# Patient Record
Sex: MALE | Race: WHITE | NOT HISPANIC OR LATINO
[De-identification: names, ages, dates, MRNs, and addresses within clinical notes are randomized per-mention and may not be internally consistent; named-entity substitution may affect disease eponyms.]

---

## 2017-05-18 ENCOUNTER — APPOINTMENT (OUTPATIENT)
Dept: HEMATOLOGY ONCOLOGY | Facility: CLINIC | Age: 72
End: 2017-05-18

## 2017-05-18 ENCOUNTER — OUTPATIENT (OUTPATIENT)
Dept: OUTPATIENT SERVICES | Facility: HOSPITAL | Age: 72
LOS: 1 days | Discharge: HOME | End: 2017-05-18

## 2017-05-18 VITALS
RESPIRATION RATE: 12 BRPM | SYSTOLIC BLOOD PRESSURE: 135 MMHG | HEART RATE: 68 BPM | TEMPERATURE: 97.1 F | BODY MASS INDEX: 25.88 KG/M2 | DIASTOLIC BLOOD PRESSURE: 59 MMHG | HEIGHT: 66 IN | WEIGHT: 161 LBS

## 2017-05-18 LAB
BASOPHILS # BLD: 0.06 TH/MM3
BASOPHILS NFR BLD: 1.5 %
EOSINOPHIL # BLD: 0.15 TH/MM3
EOSINOPHIL NFR BLD: 3.7 %
ERYTHROCYTE [DISTWIDTH] IN BLOOD BY AUTOMATED COUNT: 13.3 %
GRANULOCYTES # BLD: 2.49 TH/MM3
GRANULOCYTES NFR BLD: 62.2 %
HCT VFR BLD AUTO: 43 %
HGB BLD-MCNC: 15.2 G/DL
IMM GRANULOCYTES # BLD: 0.01 TH/MM3
IMM GRANULOCYTES NFR BLD: 0.2 %
LYMPHOCYTES # BLD: 1.02 TH/MM3
LYMPHOCYTES NFR BLD: 25.4 %
MCH RBC QN AUTO: 31 PG
MCHC RBC AUTO-ENTMCNC: 35.3 G/DL
MCV RBC AUTO: 87.8 FL
MONOCYTES # BLD: 0.28 TH/MM3
MONOCYTES NFR BLD: 7 %
PLATELET # BLD: 138 TH/MM3
PMV BLD AUTO: 10.4 FL
RBC # BLD AUTO: 4.9 MIL/MM3
WBC # BLD: 4.01 TH/MM3

## 2017-05-19 LAB
ALBUMIN SERPL-MCNC: 4.2 G/DL
ALBUMIN/GLOB SERPL: 2.21
ALP SERPL-CCNC: 55 IU/L
ALT SERPL-CCNC: 17 IU/L
ANION GAP SERPL CALC-SCNC: 11 MEQ/L
AST SERPL-CCNC: 24 IU/L
BILIRUB SERPL-MCNC: 0.8 MG/DL
BUN SERPL-MCNC: 12 MG/DL
BUN/CREAT SERPL: 12 %
CALCIUM SERPL-MCNC: 9.8 MG/DL
CHLORIDE SERPL-SCNC: 97 MEQ/L
CO2 SERPL-SCNC: 28 MEQ/L
CREAT SERPL-MCNC: 1 MG/DL
GFR SERPL CREATININE-BSD FRML MDRD: 74
GLUCOSE SERPL-MCNC: 77 MG/DL
IGG FLD-MCNC: 819 MG/DL
POTASSIUM SERPL-SCNC: 4.1 MMOL/L
PROT SERPL-MCNC: 6.1 G/DL
SODIUM SERPL-SCNC: 136 MEQ/L

## 2017-06-28 DIAGNOSIS — C85.90 NON-HODGKIN LYMPHOMA, UNSPECIFIED, UNSPECIFIED SITE: ICD-10-CM

## 2017-11-08 ENCOUNTER — APPOINTMENT (OUTPATIENT)
Dept: OTOLARYNGOLOGY | Facility: CLINIC | Age: 72
End: 2017-11-08
Payer: MEDICARE

## 2017-11-08 PROCEDURE — 99214 OFFICE O/P EST MOD 30 MIN: CPT | Mod: 25

## 2017-11-08 PROCEDURE — 31575 DIAGNOSTIC LARYNGOSCOPY: CPT

## 2017-11-08 RX ORDER — METOPROLOL SUCCINATE 100 MG/1
100 TABLET, EXTENDED RELEASE ORAL
Qty: 90 | Refills: 0 | Status: ACTIVE | COMMUNITY
Start: 2017-09-06

## 2017-11-20 ENCOUNTER — APPOINTMENT (OUTPATIENT)
Dept: HEMATOLOGY ONCOLOGY | Facility: CLINIC | Age: 72
End: 2017-11-20

## 2017-11-20 ENCOUNTER — RESULT REVIEW (OUTPATIENT)
Age: 72
End: 2017-11-20

## 2017-11-20 ENCOUNTER — OUTPATIENT (OUTPATIENT)
Dept: OUTPATIENT SERVICES | Facility: HOSPITAL | Age: 72
LOS: 1 days | Discharge: HOME | End: 2017-11-20

## 2017-11-20 VITALS
TEMPERATURE: 96.1 F | DIASTOLIC BLOOD PRESSURE: 78 MMHG | BODY MASS INDEX: 26.03 KG/M2 | WEIGHT: 162 LBS | HEIGHT: 66 IN | RESPIRATION RATE: 12 BRPM | HEART RATE: 72 BPM | SYSTOLIC BLOOD PRESSURE: 130 MMHG

## 2017-11-20 LAB
BASOPHILS # BLD: 0.02 TH/MM3
BASOPHILS NFR BLD: 0.5 %
EOSINOPHIL # BLD: 0.11 TH/MM3
EOSINOPHIL NFR BLD: 2.6 %
ERYTHROCYTE [DISTWIDTH] IN BLOOD BY AUTOMATED COUNT: 13.5 %
GRANULOCYTES # BLD: 2.78 TH/MM3
GRANULOCYTES NFR BLD: 65.9 %
HCT VFR BLD AUTO: 41.7 %
HGB BLD-MCNC: 14.4 G/DL
IMM GRANULOCYTES # BLD: 0.01 TH/MM3
IMM GRANULOCYTES NFR BLD: 0.2 %
LYMPHOCYTES # BLD: 0.83 TH/MM3
LYMPHOCYTES NFR BLD: 19.7 %
MCH RBC QN AUTO: 30.3 PG
MCHC RBC AUTO-ENTMCNC: 34.5 G/DL
MCV RBC AUTO: 87.6 FL
MONOCYTES # BLD: 0.47 TH/MM3
MONOCYTES NFR BLD: 11.1 %
PLATELET # BLD: 132 TH/MM3
PMV BLD AUTO: 11 FL
RBC # BLD AUTO: 4.76 MIL/MM3
WBC # BLD: 4.22 TH/MM3

## 2017-11-21 LAB
ALBUMIN SERPL-MCNC: 3.9 G/DL
ALBUMIN/GLOB SERPL: 2.17
ALP SERPL-CCNC: 54 IU/L
ALT SERPL-CCNC: 15 IU/L
ANION GAP SERPL CALC-SCNC: 10 MEQ/L
AST SERPL-CCNC: 18 IU/L
BILIRUB SERPL-MCNC: 0.7 MG/DL
BUN SERPL-MCNC: 14 MG/DL
BUN/CREAT SERPL: 15.2 %
CALCIUM SERPL-MCNC: 9.3 MG/DL
CHLORIDE SERPL-SCNC: 99 MEQ/L
CO2 SERPL-SCNC: 28 MEQ/L
CREAT SERPL-MCNC: 0.92 MG/DL
GFR SERPL CREATININE-BSD FRML MDRD: 81
GLUCOSE SERPL-MCNC: 65 MG/DL
LACTATE DEHYDROGENASE (NORTH): 167 IU/L
POTASSIUM SERPL-SCNC: 3.9 MMOL/L
PROT SERPL-MCNC: 5.7 G/DL
SODIUM SERPL-SCNC: 137 MEQ/L

## 2017-11-27 DIAGNOSIS — C85.90 NON-HODGKIN LYMPHOMA, UNSPECIFIED, UNSPECIFIED SITE: ICD-10-CM

## 2017-12-13 ENCOUNTER — APPOINTMENT (OUTPATIENT)
Dept: OTOLARYNGOLOGY | Facility: CLINIC | Age: 72
End: 2017-12-13
Payer: MEDICARE

## 2017-12-13 VITALS — HEIGHT: 66 IN | WEIGHT: 162 LBS | BODY MASS INDEX: 26.03 KG/M2

## 2017-12-13 PROCEDURE — 31575 DIAGNOSTIC LARYNGOSCOPY: CPT

## 2017-12-13 PROCEDURE — 99213 OFFICE O/P EST LOW 20 MIN: CPT | Mod: 25

## 2018-01-31 ENCOUNTER — APPOINTMENT (OUTPATIENT)
Dept: OTOLARYNGOLOGY | Facility: CLINIC | Age: 73
End: 2018-01-31
Payer: MEDICARE

## 2018-01-31 PROCEDURE — 31575 DIAGNOSTIC LARYNGOSCOPY: CPT

## 2018-01-31 PROCEDURE — 99213 OFFICE O/P EST LOW 20 MIN: CPT | Mod: 25

## 2018-01-31 RX ORDER — OMEPRAZOLE 40 MG/1
40 CAPSULE, DELAYED RELEASE ORAL TWICE DAILY
Qty: 60 | Refills: 3 | Status: ACTIVE | COMMUNITY
Start: 2018-01-31 | End: 1900-01-01

## 2018-02-28 ENCOUNTER — APPOINTMENT (OUTPATIENT)
Dept: OTOLARYNGOLOGY | Facility: CLINIC | Age: 73
End: 2018-02-28
Payer: MEDICARE

## 2018-02-28 PROCEDURE — 99213 OFFICE O/P EST LOW 20 MIN: CPT | Mod: 25

## 2018-02-28 PROCEDURE — 31575 DIAGNOSTIC LARYNGOSCOPY: CPT

## 2018-04-11 ENCOUNTER — APPOINTMENT (OUTPATIENT)
Dept: OTOLARYNGOLOGY | Facility: CLINIC | Age: 73
End: 2018-04-11
Payer: MEDICARE

## 2018-04-11 PROCEDURE — 31575 DIAGNOSTIC LARYNGOSCOPY: CPT

## 2018-04-11 PROCEDURE — 99213 OFFICE O/P EST LOW 20 MIN: CPT | Mod: 25

## 2018-04-12 ENCOUNTER — LABORATORY RESULT (OUTPATIENT)
Age: 73
End: 2018-04-12

## 2018-04-12 ENCOUNTER — OUTPATIENT (OUTPATIENT)
Dept: OUTPATIENT SERVICES | Facility: HOSPITAL | Age: 73
LOS: 1 days | Discharge: HOME | End: 2018-04-12

## 2018-04-12 DIAGNOSIS — R49.0 DYSPHONIA: ICD-10-CM

## 2018-04-23 ENCOUNTER — OUTPATIENT (OUTPATIENT)
Dept: OUTPATIENT SERVICES | Facility: HOSPITAL | Age: 73
LOS: 1 days | Discharge: HOME | End: 2018-04-23

## 2018-04-23 DIAGNOSIS — R49.0 DYSPHONIA: ICD-10-CM

## 2018-04-24 ENCOUNTER — EMERGENCY (EMERGENCY)
Facility: HOSPITAL | Age: 73
LOS: 0 days | Discharge: LEFT BEFORE DISCHARGE | End: 2018-04-24
Attending: EMERGENCY MEDICINE | Admitting: EMERGENCY MEDICINE

## 2018-04-24 VITALS
TEMPERATURE: 98 F | DIASTOLIC BLOOD PRESSURE: 60 MMHG | RESPIRATION RATE: 18 BRPM | HEART RATE: 85 BPM | SYSTOLIC BLOOD PRESSURE: 110 MMHG | OXYGEN SATURATION: 98 %

## 2018-04-24 DIAGNOSIS — R21 RASH AND OTHER NONSPECIFIC SKIN ERUPTION: ICD-10-CM

## 2018-04-24 DIAGNOSIS — E89.0 POSTPROCEDURAL HYPOTHYROIDISM: Chronic | ICD-10-CM

## 2018-04-24 DIAGNOSIS — R51 HEADACHE: ICD-10-CM

## 2018-04-24 DIAGNOSIS — I10 ESSENTIAL (PRIMARY) HYPERTENSION: ICD-10-CM

## 2018-04-24 DIAGNOSIS — C85.90 NON-HODGKIN LYMPHOMA, UNSPECIFIED, UNSPECIFIED SITE: ICD-10-CM

## 2018-04-24 DIAGNOSIS — E03.9 HYPOTHYROIDISM, UNSPECIFIED: ICD-10-CM

## 2018-04-24 DIAGNOSIS — F17.210 NICOTINE DEPENDENCE, CIGARETTES, UNCOMPLICATED: ICD-10-CM

## 2018-04-24 RX ORDER — DIPHENHYDRAMINE HCL 50 MG
1 CAPSULE ORAL
Qty: 12 | Refills: 0 | OUTPATIENT
Start: 2018-04-24 | End: 2018-04-26

## 2018-04-24 NOTE — ED PROVIDER NOTE - OBJECTIVE STATEMENT
This is a 73 yo M patient with small lymphocytic lymphoma under observation, hx of radiation in the past, hypothyroidism, HTN saw ENT in April for hoarseness of his voice had inflammed vocal cords on laryngoscopy attributed to GERD sent on Protonix and ranitidine and CT neck with IV contrast, around 12:30 pm patient had contrast CT and he started developping chills, subjective fever around 3 pm. Then patient developped an urticarial rash on his hands and neck and face. patient says his symptoms are better now. Patient has had a hx of allergic reaction on a prior CT with IV contrast that was milder in severity according to patient.

## 2018-04-24 NOTE — ED PROVIDER NOTE - ATTENDING CONTRIBUTION TO CARE
72M PMH lymphoma not on chemo, smoker, gerd, hypothyroid, p/w rash to face/hands, temp 99 at home and mild frontal gradual onset throbbing HA after receiving CT soft tissue neck IV contrast yesterday. states he had similar reaction years ago to IV contrast and is allergic to shellfish. No numbness, weakness, tingling. No visual/gait/speech changes. No AMS. no Neck pain, stiffness. No photophobia. No cp, sob. No throat swelling, pain, drooling. Tolerating po. no abd pain, nvdc. No dysuria, freq, hematuria. Redness to hands and face is not painful or itching. Does not involve mucus membranes. No recent abx. Pt takes ranitidine daily. Soft tissue neck for raspy voice, by dr yang, states laryngoscopy did not reveal cause. on exam, AFVSS, well susie nad, ncat, eomi, perrla, mmm, OP no erythema/edema or exudates, normal speech no drooling, mild erythema to bilat cheeks, no mucus membrane involvement, mild erythema to dorsum bilat hands, no warmth, nontender, no bullae, no crepitus or necrosis, lctab, rrr nl s1s2 no mrg, abd soft ntnd, Alert and Oriented x 3, Cranial nerves 2-12 intact, No nystagmus.  5/5 motor x 4 extremities, Sensation intact to light touch x 4 extremities, No facial droop or slurred speech. No pronator drift.  No midline cervical/thoracic/lumbar tenderness to palpation or step off. Normal gait, No ataxia. No photophobia or nuchal rigidity, no le edema or calf ttp, a/p; ?allergic rxn to IV contrast, pt reports fever but states 99 at home. afebrile, no meningeal signs. NV intact. pt refusing all work up or treatment in ED. Has capacity. signed out AMA. aaox3.

## 2018-04-24 NOTE — ED PROVIDER NOTE - PHYSICAL EXAMINATION
Patient refused examination by me because he said he does not want to wait and wants to see his oncologist Dr Carney next month. I asks to leave AMA.

## 2018-04-24 NOTE — ED PROVIDER NOTE - PROGRESS NOTE DETAILS
pt refusing to stay for work up and treatment of sub fever (99 at home), rash, HA after receiving IV contrast for CT soft tissue neck yesterday. States had similar reaction to IV contrast years ago and is allergic shellfish. No throat swelling. no cp, sob, abd pain, nvdc. Erythema and flushing to bilat cheeks and hands. LCTAB, RRR, OP clear no edema or exudates abd soft ntnd, a/p: possible allergic rxn, however hx lymphoma, needs labs, xr, onc c/s, ?CT/LP, further work up. Pt refusing. has capacity to refuse. understands risk of death, anaphylaxis, cardiac/resp arrest, severe morbidity/disability. Will dc home w benadryl and prednisone (pt refusing any in ED and also refusing script but gave us pharmacy to send anyways), recommended close f/u pmd/onc tomorrow, strict return precautions provided. understands can come back to ED at any time it changes his mind. unfortunately, will sign out AMA. pt refusing to stay for work up and treatment of sub fever (99 at home), rash, HA after receiving IV contrast for CT soft tissue neck yesterday. States had similar reaction to IV contrast years ago and is allergic shellfish. No throat swelling. no cp, sob, abd pain, nvdc. Erythema and flushing to bilat cheeks and hands. LCTAB, RRR, OP clear no edema or exudates abd soft ntnd, a/p: possible allergic rxn, however hx lymphoma, needs labs, xr, onc c/s, further work up. Pt refusing. has capacity to refuse. understands risk of death, anaphylaxis, cardiac/resp arrest, severe morbidity/disability. Will dc home w benadryl and prednisone (pt refusing any in ED and also refusing script but gave us pharmacy to send anyways), recommended close f/u pmd/onc tomorrow, strict return precautions provided. understands can come back to ED at any time it changes his mind. unfortunately, will sign out AMA.

## 2018-04-24 NOTE — ED ADULT NURSE NOTE - OBJECTIVE STATEMENT
71 y/o male presents to the ED c/o possible allergic rxn after having CT scan with IV contrast of neck yesterday. Pt states he had a low grade fever, chills, with development of petechiae on B/L cheeks. Pt denies SOB, CP, palpitations, pruritis, hives, or any other s/s. Reports only having mild HA at present. Afebrile at triage.

## 2018-05-17 ENCOUNTER — APPOINTMENT (OUTPATIENT)
Dept: OTOLARYNGOLOGY | Facility: CLINIC | Age: 73
End: 2018-05-17
Payer: MEDICARE

## 2018-05-17 PROCEDURE — 99214 OFFICE O/P EST MOD 30 MIN: CPT | Mod: 25

## 2018-05-17 PROCEDURE — 31575 DIAGNOSTIC LARYNGOSCOPY: CPT

## 2018-05-21 ENCOUNTER — OUTPATIENT (OUTPATIENT)
Dept: OUTPATIENT SERVICES | Facility: HOSPITAL | Age: 73
LOS: 1 days | Discharge: HOME | End: 2018-05-21

## 2018-05-21 ENCOUNTER — LABORATORY RESULT (OUTPATIENT)
Age: 73
End: 2018-05-21

## 2018-05-21 ENCOUNTER — APPOINTMENT (OUTPATIENT)
Dept: HEMATOLOGY ONCOLOGY | Facility: CLINIC | Age: 73
End: 2018-05-21

## 2018-05-21 VITALS
HEART RATE: 78 BPM | DIASTOLIC BLOOD PRESSURE: 72 MMHG | TEMPERATURE: 96.9 F | HEIGHT: 66 IN | RESPIRATION RATE: 14 BRPM | SYSTOLIC BLOOD PRESSURE: 140 MMHG | BODY MASS INDEX: 26.03 KG/M2 | WEIGHT: 162 LBS

## 2018-05-21 DIAGNOSIS — E89.0 POSTPROCEDURAL HYPOTHYROIDISM: Chronic | ICD-10-CM

## 2018-05-21 LAB
HCT VFR BLD CALC: 44.3 %
HGB BLD-MCNC: 14.9 G/DL
MCHC RBC-ENTMCNC: 29.4 PG
MCHC RBC-ENTMCNC: 33.6 G/DL
MCV RBC AUTO: 87.4 FL
PLATELET # BLD AUTO: 173 K/UL
PMV BLD: 10.7 FL
RBC # BLD: 5.07 M/UL
RBC # FLD: 13.2 %
WBC # FLD AUTO: 4.12 K/UL

## 2018-05-22 LAB
ALBUMIN SERPL ELPH-MCNC: 4.4 G/DL
ALP BLD-CCNC: 71 U/L
ALT SERPL-CCNC: 13 U/L
ANION GAP SERPL CALC-SCNC: 14 MMOL/L
AST SERPL-CCNC: 19 U/L
BILIRUB SERPL-MCNC: 0.4 MG/DL
BUN SERPL-MCNC: 9 MG/DL
CALCIUM SERPL-MCNC: 10.4 MG/DL
CHLORIDE SERPL-SCNC: 94 MMOL/L
CO2 SERPL-SCNC: 29 MMOL/L
CREAT SERPL-MCNC: 1 MG/DL
GLUCOSE SERPL-MCNC: 93 MG/DL
IGG SER QL IEP: 760 MG/DL
LDH SERPL-CCNC: 200 U/L
POTASSIUM SERPL-SCNC: 4.8 MMOL/L
PROT SERPL-MCNC: 6.6 G/DL
SODIUM SERPL-SCNC: 137 MMOL/L

## 2018-05-23 DIAGNOSIS — C85.90 NON-HODGKIN LYMPHOMA, UNSPECIFIED, UNSPECIFIED SITE: ICD-10-CM

## 2018-06-14 ENCOUNTER — FORM ENCOUNTER (OUTPATIENT)
Age: 73
End: 2018-06-14

## 2018-06-15 ENCOUNTER — OUTPATIENT (OUTPATIENT)
Dept: OUTPATIENT SERVICES | Facility: HOSPITAL | Age: 73
LOS: 1 days | Discharge: HOME | End: 2018-06-15

## 2018-06-15 VITALS
DIASTOLIC BLOOD PRESSURE: 70 MMHG | TEMPERATURE: 98 F | HEIGHT: 67 IN | SYSTOLIC BLOOD PRESSURE: 134 MMHG | OXYGEN SATURATION: 100 % | HEART RATE: 67 BPM | WEIGHT: 315 LBS | RESPIRATION RATE: 18 BRPM

## 2018-06-15 DIAGNOSIS — E89.0 POSTPROCEDURAL HYPOTHYROIDISM: Chronic | ICD-10-CM

## 2018-06-15 DIAGNOSIS — Z01.818 ENCOUNTER FOR OTHER PREPROCEDURAL EXAMINATION: ICD-10-CM

## 2018-06-15 DIAGNOSIS — J38.4 EDEMA OF LARYNX: ICD-10-CM

## 2018-06-15 LAB
ALBUMIN SERPL ELPH-MCNC: 4.7 G/DL — SIGNIFICANT CHANGE UP (ref 3.5–5.2)
ALP SERPL-CCNC: 69 U/L — SIGNIFICANT CHANGE UP (ref 30–115)
ALT FLD-CCNC: 12 U/L — SIGNIFICANT CHANGE UP (ref 0–41)
ANION GAP SERPL CALC-SCNC: 17 MMOL/L — HIGH (ref 7–14)
APTT BLD: 39.5 SEC — HIGH (ref 27–39.2)
AST SERPL-CCNC: 21 U/L — SIGNIFICANT CHANGE UP (ref 0–41)
BASOPHILS # BLD AUTO: 0.06 K/UL — SIGNIFICANT CHANGE UP (ref 0–0.2)
BASOPHILS NFR BLD AUTO: 1.3 % — HIGH (ref 0–1)
BILIRUB SERPL-MCNC: 0.5 MG/DL — SIGNIFICANT CHANGE UP (ref 0.2–1.2)
BUN SERPL-MCNC: 13 MG/DL — SIGNIFICANT CHANGE UP (ref 10–20)
CALCIUM SERPL-MCNC: 9.6 MG/DL — SIGNIFICANT CHANGE UP (ref 8.5–10.1)
CHLORIDE SERPL-SCNC: 93 MMOL/L — LOW (ref 98–110)
CO2 SERPL-SCNC: 25 MMOL/L — SIGNIFICANT CHANGE UP (ref 17–32)
CREAT SERPL-MCNC: 1.1 MG/DL — SIGNIFICANT CHANGE UP (ref 0.7–1.5)
EOSINOPHIL # BLD AUTO: 0.14 K/UL — SIGNIFICANT CHANGE UP (ref 0–0.7)
EOSINOPHIL NFR BLD AUTO: 3 % — SIGNIFICANT CHANGE UP (ref 0–8)
GLUCOSE SERPL-MCNC: 69 MG/DL — LOW (ref 70–99)
HCT VFR BLD CALC: 43.3 % — SIGNIFICANT CHANGE UP (ref 42–52)
HGB BLD-MCNC: 14.8 G/DL — SIGNIFICANT CHANGE UP (ref 14–18)
IMM GRANULOCYTES NFR BLD AUTO: 0.4 % — HIGH (ref 0.1–0.3)
INR BLD: 1.01 RATIO — SIGNIFICANT CHANGE UP (ref 0.65–1.3)
LYMPHOCYTES # BLD AUTO: 1.02 K/UL — LOW (ref 1.2–3.4)
LYMPHOCYTES # BLD AUTO: 21.8 % — SIGNIFICANT CHANGE UP (ref 20.5–51.1)
MCHC RBC-ENTMCNC: 29.5 PG — SIGNIFICANT CHANGE UP (ref 27–31)
MCHC RBC-ENTMCNC: 34.2 G/DL — SIGNIFICANT CHANGE UP (ref 32–37)
MCV RBC AUTO: 86.4 FL — SIGNIFICANT CHANGE UP (ref 80–94)
MONOCYTES # BLD AUTO: 0.45 K/UL — SIGNIFICANT CHANGE UP (ref 0.1–0.6)
MONOCYTES NFR BLD AUTO: 9.6 % — HIGH (ref 1.7–9.3)
NEUTROPHILS # BLD AUTO: 2.99 K/UL — SIGNIFICANT CHANGE UP (ref 1.4–6.5)
NEUTROPHILS NFR BLD AUTO: 63.9 % — SIGNIFICANT CHANGE UP (ref 42.2–75.2)
NRBC # BLD: 0 /100 WBCS — SIGNIFICANT CHANGE UP (ref 0–0)
PLATELET # BLD AUTO: 196 K/UL — SIGNIFICANT CHANGE UP (ref 130–400)
POTASSIUM SERPL-MCNC: 4.1 MMOL/L — SIGNIFICANT CHANGE UP (ref 3.5–5)
POTASSIUM SERPL-SCNC: 4.1 MMOL/L — SIGNIFICANT CHANGE UP (ref 3.5–5)
PROT SERPL-MCNC: 6.9 G/DL — SIGNIFICANT CHANGE UP (ref 6–8)
PROTHROM AB SERPL-ACNC: 10.9 SEC — SIGNIFICANT CHANGE UP (ref 9.95–12.87)
RBC # BLD: 5.01 M/UL — SIGNIFICANT CHANGE UP (ref 4.7–6.1)
RBC # FLD: 13.2 % — SIGNIFICANT CHANGE UP (ref 11.5–14.5)
SODIUM SERPL-SCNC: 135 MMOL/L — SIGNIFICANT CHANGE UP (ref 135–146)
WBC # BLD: 4.68 K/UL — LOW (ref 4.8–10.8)
WBC # FLD AUTO: 4.68 K/UL — LOW (ref 4.8–10.8)

## 2018-06-15 RX ORDER — LEVOTHYROXINE SODIUM 125 MCG
0 TABLET ORAL
Qty: 0 | Refills: 0 | COMMUNITY

## 2018-06-15 NOTE — H&P PST ADULT - HISTORY OF PRESENT ILLNESS
Patient scheduled for the above procedure due to hoarseness of voice x 1 yr. Soft tissue sonogram of the neck done and it shown thickness of the larynx. Patient had me diagnosed or chronic small B cell lymphoma x 2 yrs. No chemo or radiation needed. on 06/15/2018 PAST visit patient denies any c?o cp, sob, palpations, fever, cough or dysuria. Ex tolerance of 2 fos walk with out SOB. No CAROL. Patient scheduled for the above procedure due to hoarseness of voice x 1 yr. CT of neck with contrast done on 04/23/2018 and it shown thickening/enhancement along the mucosal surface of the right glottic/subglottic larynx suspicious for neoplasm. On 6/15/2018 PAST visit patient denies any c/o cp, sob, palpations, fever, cough or dysuria. Ex tolerance of 2 fos walk with out SOB. No CAROL.

## 2018-06-15 NOTE — H&P PST ADULT - REASON FOR ADMISSION
73 yo m presents to PAST for 73 yo m presents to [facility] for right laryngoscopy and biopsy under GA on 06/26/2018 at [facility] OR  by Dr. Frias.

## 2018-06-15 NOTE — H&P PST ADULT - PMH
HTN (hypertension)    Hypothyroid    Lymphoma HTN (hypertension)    Hypothyroid    Lymphoma  Non hodgkins x 2 yrs No chemo or radiation HTN (hypertension)    Hypothyroid    Lymphoma  Non hodgkin's x 2 yrs No chemo or radiation

## 2018-06-26 ENCOUNTER — RESULT REVIEW (OUTPATIENT)
Age: 73
End: 2018-06-26

## 2018-06-26 ENCOUNTER — APPOINTMENT (OUTPATIENT)
Dept: OTOLARYNGOLOGY | Facility: AMBULATORY SURGERY CENTER | Age: 73
End: 2018-06-26
Payer: MEDICARE

## 2018-06-26 ENCOUNTER — OUTPATIENT (OUTPATIENT)
Dept: OUTPATIENT SERVICES | Facility: HOSPITAL | Age: 73
LOS: 1 days | Discharge: HOME | End: 2018-06-26

## 2018-06-26 VITALS
HEART RATE: 59 BPM | SYSTOLIC BLOOD PRESSURE: 111 MMHG | OXYGEN SATURATION: 97 % | RESPIRATION RATE: 18 BRPM | DIASTOLIC BLOOD PRESSURE: 61 MMHG

## 2018-06-26 VITALS
HEIGHT: 67 IN | WEIGHT: 315 LBS | SYSTOLIC BLOOD PRESSURE: 137 MMHG | TEMPERATURE: 97 F | HEART RATE: 61 BPM | DIASTOLIC BLOOD PRESSURE: 60 MMHG | OXYGEN SATURATION: 97 % | RESPIRATION RATE: 18 BRPM

## 2018-06-26 DIAGNOSIS — Z98.890 OTHER SPECIFIED POSTPROCEDURAL STATES: Chronic | ICD-10-CM

## 2018-06-26 DIAGNOSIS — E89.0 POSTPROCEDURAL HYPOTHYROIDISM: Chronic | ICD-10-CM

## 2018-06-26 PROCEDURE — 31540 LARYNGOSCOPY W/EXC OF TUMOR: CPT | Mod: RT

## 2018-06-26 RX ORDER — MORPHINE SULFATE 50 MG/1
2 CAPSULE, EXTENDED RELEASE ORAL
Qty: 0 | Refills: 0 | Status: DISCONTINUED | OUTPATIENT
Start: 2018-06-26 | End: 2018-06-26

## 2018-06-26 RX ORDER — SODIUM CHLORIDE 9 MG/ML
1000 INJECTION, SOLUTION INTRAVENOUS
Qty: 0 | Refills: 0 | Status: DISCONTINUED | OUTPATIENT
Start: 2018-06-26 | End: 2018-07-11

## 2018-06-26 RX ORDER — OXYCODONE AND ACETAMINOPHEN 5; 325 MG/1; MG/1
1 TABLET ORAL EVERY 4 HOURS
Qty: 0 | Refills: 0 | Status: DISCONTINUED | OUTPATIENT
Start: 2018-06-26 | End: 2018-06-26

## 2018-06-26 RX ORDER — ONDANSETRON 8 MG/1
4 TABLET, FILM COATED ORAL ONCE
Qty: 0 | Refills: 0 | Status: DISCONTINUED | OUTPATIENT
Start: 2018-06-26 | End: 2018-07-11

## 2018-06-26 RX ADMIN — SODIUM CHLORIDE 100 MILLILITER(S): 9 INJECTION, SOLUTION INTRAVENOUS at 13:47

## 2018-06-26 NOTE — BRIEF OPERATIVE NOTE - PROCEDURE
<<-----Click on this checkbox to enter Procedure Laryngoscopy with sugical removal of lesion of vocal cord  06/26/2018    Active  [id number]

## 2018-06-26 NOTE — ASU DISCHARGE PLAN (ADULT/PEDIATRIC). - ASU FOLLOWUP
[facility]:  Endoscopy/Ambulatory Surgery [place]... [facility]:  [place] [facility]...

## 2018-06-28 LAB — SURGICAL PATHOLOGY STUDY: SIGNIFICANT CHANGE UP

## 2018-06-29 DIAGNOSIS — F17.210 NICOTINE DEPENDENCE, CIGARETTES, UNCOMPLICATED: ICD-10-CM

## 2018-06-29 DIAGNOSIS — D14.1 BENIGN NEOPLASM OF LARYNX: ICD-10-CM

## 2018-06-29 DIAGNOSIS — E03.9 HYPOTHYROIDISM, UNSPECIFIED: ICD-10-CM

## 2018-06-29 DIAGNOSIS — I10 ESSENTIAL (PRIMARY) HYPERTENSION: ICD-10-CM

## 2018-06-29 DIAGNOSIS — J38.7 OTHER DISEASES OF LARYNX: ICD-10-CM

## 2018-06-29 DIAGNOSIS — J38.3 OTHER DISEASES OF VOCAL CORDS: ICD-10-CM

## 2018-06-29 DIAGNOSIS — Z88.2 ALLERGY STATUS TO SULFONAMIDES: ICD-10-CM

## 2018-07-05 ENCOUNTER — APPOINTMENT (OUTPATIENT)
Dept: OTOLARYNGOLOGY | Facility: CLINIC | Age: 73
End: 2018-07-05
Payer: MEDICARE

## 2018-07-05 VITALS
SYSTOLIC BLOOD PRESSURE: 123 MMHG | HEIGHT: 66 IN | BODY MASS INDEX: 26.03 KG/M2 | WEIGHT: 162 LBS | DIASTOLIC BLOOD PRESSURE: 88 MMHG

## 2018-07-05 PROCEDURE — 31575 DIAGNOSTIC LARYNGOSCOPY: CPT

## 2018-07-05 PROCEDURE — 99213 OFFICE O/P EST LOW 20 MIN: CPT | Mod: 25

## 2018-08-09 ENCOUNTER — APPOINTMENT (OUTPATIENT)
Dept: OTOLARYNGOLOGY | Facility: CLINIC | Age: 73
End: 2018-08-09
Payer: MEDICARE

## 2018-08-09 VITALS
DIASTOLIC BLOOD PRESSURE: 75 MMHG | WEIGHT: 162 LBS | BODY MASS INDEX: 26.03 KG/M2 | HEIGHT: 66 IN | SYSTOLIC BLOOD PRESSURE: 120 MMHG

## 2018-08-09 PROBLEM — I10 ESSENTIAL (PRIMARY) HYPERTENSION: Chronic | Status: ACTIVE | Noted: 2018-04-24

## 2018-08-09 PROBLEM — E03.9 HYPOTHYROIDISM, UNSPECIFIED: Chronic | Status: ACTIVE | Noted: 2018-04-24

## 2018-08-09 PROCEDURE — 31575 DIAGNOSTIC LARYNGOSCOPY: CPT

## 2018-08-09 PROCEDURE — 99212 OFFICE O/P EST SF 10 MIN: CPT | Mod: 25

## 2018-10-24 ENCOUNTER — APPOINTMENT (OUTPATIENT)
Dept: OTOLARYNGOLOGY | Facility: CLINIC | Age: 73
End: 2018-10-24
Payer: MEDICARE

## 2018-10-24 PROCEDURE — 99213 OFFICE O/P EST LOW 20 MIN: CPT | Mod: 25

## 2018-10-24 PROCEDURE — 31575 DIAGNOSTIC LARYNGOSCOPY: CPT

## 2018-11-26 ENCOUNTER — APPOINTMENT (OUTPATIENT)
Dept: HEMATOLOGY ONCOLOGY | Facility: CLINIC | Age: 73
End: 2018-11-26

## 2018-11-26 ENCOUNTER — LABORATORY RESULT (OUTPATIENT)
Age: 73
End: 2018-11-26

## 2018-11-26 ENCOUNTER — OUTPATIENT (OUTPATIENT)
Dept: OUTPATIENT SERVICES | Facility: HOSPITAL | Age: 73
LOS: 1 days | Discharge: HOME | End: 2018-11-26

## 2018-11-26 VITALS
RESPIRATION RATE: 14 BRPM | BODY MASS INDEX: 26.36 KG/M2 | DIASTOLIC BLOOD PRESSURE: 54 MMHG | HEIGHT: 66 IN | WEIGHT: 164 LBS | TEMPERATURE: 96.1 F | SYSTOLIC BLOOD PRESSURE: 108 MMHG

## 2018-11-26 DIAGNOSIS — Z98.890 OTHER SPECIFIED POSTPROCEDURAL STATES: Chronic | ICD-10-CM

## 2018-11-26 DIAGNOSIS — Z85.79 PERSONAL HISTORY OF OTHER MALIGNANT NEOPLASMS OF LYMPHOID, HEMATOPOIETIC AND RELATED TISSUES: ICD-10-CM

## 2018-11-26 DIAGNOSIS — E89.0 POSTPROCEDURAL HYPOTHYROIDISM: Chronic | ICD-10-CM

## 2018-11-26 LAB
ALBUMIN SERPL ELPH-MCNC: 4.2 G/DL
ALP BLD-CCNC: 72 U/L
ALT SERPL-CCNC: 11 U/L
ANION GAP SERPL CALC-SCNC: 16 MMOL/L
AST SERPL-CCNC: 19 U/L
BILIRUB SERPL-MCNC: 0.3 MG/DL
BUN SERPL-MCNC: 14 MG/DL
CALCIUM SERPL-MCNC: 9.5 MG/DL
CHLORIDE SERPL-SCNC: 95 MMOL/L
CO2 SERPL-SCNC: 27 MMOL/L
CREAT SERPL-MCNC: 1 MG/DL
GLUCOSE SERPL-MCNC: 101 MG/DL
HCT VFR BLD CALC: 42.4 %
HGB BLD-MCNC: 14.1 G/DL
LDH SERPL-CCNC: 201 U/L
MCHC RBC-ENTMCNC: 29.1 PG
MCHC RBC-ENTMCNC: 33.3 G/DL
MCV RBC AUTO: 87.6 FL
PLATELET # BLD AUTO: 189 K/UL
PMV BLD: 10.4 FL
POTASSIUM SERPL-SCNC: 3.6 MMOL/L
PROT SERPL-MCNC: 6.4 G/DL
RBC # BLD: 4.84 M/UL
RBC # FLD: 14.6 %
SODIUM SERPL-SCNC: 138 MMOL/L
WBC # FLD AUTO: 4.35 K/UL

## 2018-11-26 NOTE — PHYSICAL EXAM
[Fully active, able to carry on all pre-disease performance without restriction] : Status 0 - Fully active, able to carry on all pre-disease performance without restriction [Normal] : affect appropriate [de-identified] : May be suspicion of a small lymph node in the left supraclavicular and left axillary areas. [de-identified] : Mild arthritic changes

## 2018-11-26 NOTE — HISTORY OF PRESENT ILLNESS
[Disease:__________________________] : Disease: [unfilled] [de-identified] : The patient is coming for his regularly scheduled follow up for his lymphocytic lymphoma.\par Lately, he has been following with his ENT specialist for his hoarseness. 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 No fever or night sweats. The appetite is good. He has stopped smoking a few weeks ago.

## 2018-11-26 NOTE — ASSESSMENT
[FreeTextEntry1] : Small lymphocytic lymphoma, clinically stable. If anything, the previously palpated lymph nodes cannot be felt any longer.\par \par We will obtain a CBC, CMP, LDH, IgG. If all within acceptable limits, the patient will continue to be observed off all treatments.\par \par He will keep his appointments with his ENT specialist otherwise for his laryngeal papilloma.\par \par All questions answered.

## 2018-11-26 NOTE — REVIEW OF SYSTEMS
[Recent Change In Weight] : ~T recent weight change [Joint Pain] : joint pain [Negative] : Heme/Lymph [FreeTextEntry2] : Slight gain [FreeTextEntry9] : Occasional back pain

## 2018-11-28 DIAGNOSIS — C83.00 SMALL CELL B-CELL LYMPHOMA, UNSPECIFIED SITE: ICD-10-CM

## 2018-11-28 LAB — IGG SER QL IEP: 788 MG/DL

## 2018-12-12 ENCOUNTER — APPOINTMENT (OUTPATIENT)
Dept: OTOLARYNGOLOGY | Facility: CLINIC | Age: 73
End: 2018-12-12
Payer: MEDICARE

## 2018-12-12 DIAGNOSIS — J38.3 OTHER DISEASES OF VOCAL CORDS: ICD-10-CM

## 2018-12-12 PROCEDURE — 31575 DIAGNOSTIC LARYNGOSCOPY: CPT

## 2018-12-12 PROCEDURE — 99213 OFFICE O/P EST LOW 20 MIN: CPT | Mod: 25

## 2019-01-01 ENCOUNTER — OUTPATIENT (OUTPATIENT)
Dept: OUTPATIENT SERVICES | Facility: HOSPITAL | Age: 74
LOS: 1 days | Discharge: HOME | End: 2019-01-01

## 2019-01-01 ENCOUNTER — TRANSCRIPTION ENCOUNTER (OUTPATIENT)
Age: 74
End: 2019-01-01

## 2019-01-01 ENCOUNTER — OTHER (OUTPATIENT)
Age: 74
End: 2019-01-01

## 2019-01-01 ENCOUNTER — APPOINTMENT (OUTPATIENT)
Dept: RADIATION ONCOLOGY | Facility: HOSPITAL | Age: 74
End: 2019-01-01

## 2019-01-01 ENCOUNTER — OUTPATIENT (OUTPATIENT)
Dept: OUTPATIENT SERVICES | Facility: HOSPITAL | Age: 74
LOS: 1 days | Discharge: HOME | End: 2019-01-01
Payer: MEDICARE

## 2019-01-01 ENCOUNTER — RESULT REVIEW (OUTPATIENT)
Age: 74
End: 2019-01-01

## 2019-01-01 ENCOUNTER — RX RENEWAL (OUTPATIENT)
Age: 74
End: 2019-01-01

## 2019-01-01 ENCOUNTER — APPOINTMENT (OUTPATIENT)
Dept: CARDIOLOGY | Facility: CLINIC | Age: 74
End: 2019-01-01
Payer: MEDICARE

## 2019-01-01 ENCOUNTER — APPOINTMENT (OUTPATIENT)
Dept: CARDIOTHORACIC SURGERY | Facility: CLINIC | Age: 74
End: 2019-01-01
Payer: MEDICARE

## 2019-01-01 ENCOUNTER — INPATIENT (INPATIENT)
Facility: HOSPITAL | Age: 74
LOS: 1 days | Discharge: HOME | End: 2019-10-04
Attending: INTERNAL MEDICINE | Admitting: INTERNAL MEDICINE
Payer: MEDICARE

## 2019-01-01 ENCOUNTER — FORM ENCOUNTER (OUTPATIENT)
Age: 74
End: 2019-01-01

## 2019-01-01 ENCOUNTER — INBOUND DOCUMENT (OUTPATIENT)
Age: 74
End: 2019-01-01

## 2019-01-01 ENCOUNTER — APPOINTMENT (OUTPATIENT)
Dept: OTOLARYNGOLOGY | Facility: CLINIC | Age: 74
End: 2019-01-01

## 2019-01-01 ENCOUNTER — APPOINTMENT (OUTPATIENT)
Dept: HEMATOLOGY ONCOLOGY | Facility: CLINIC | Age: 74
End: 2019-01-01

## 2019-01-01 ENCOUNTER — INPATIENT (INPATIENT)
Facility: HOSPITAL | Age: 74
LOS: 0 days | Discharge: HOME | End: 2019-11-08
Attending: THORACIC SURGERY (CARDIOTHORACIC VASCULAR SURGERY) | Admitting: THORACIC SURGERY (CARDIOTHORACIC VASCULAR SURGERY)
Payer: MEDICARE

## 2019-01-01 VITALS
OXYGEN SATURATION: 97 % | HEART RATE: 74 BPM | TEMPERATURE: 98 F | DIASTOLIC BLOOD PRESSURE: 55 MMHG | WEIGHT: 162.92 LBS | SYSTOLIC BLOOD PRESSURE: 114 MMHG | RESPIRATION RATE: 20 BRPM | HEIGHT: 67 IN

## 2019-01-01 VITALS
WEIGHT: 164.91 LBS | HEIGHT: 67 IN | RESPIRATION RATE: 18 BRPM | SYSTOLIC BLOOD PRESSURE: 112 MMHG | TEMPERATURE: 98 F | HEART RATE: 72 BPM

## 2019-01-01 VITALS
TEMPERATURE: 98 F | HEIGHT: 67 IN | HEART RATE: 88 BPM | WEIGHT: 162.04 LBS | RESPIRATION RATE: 18 BRPM | SYSTOLIC BLOOD PRESSURE: 116 MMHG | DIASTOLIC BLOOD PRESSURE: 56 MMHG

## 2019-01-01 VITALS
BODY MASS INDEX: 25.43 KG/M2 | TEMPERATURE: 97.4 F | HEIGHT: 67 IN | RESPIRATION RATE: 13 BRPM | HEART RATE: 86 BPM | DIASTOLIC BLOOD PRESSURE: 60 MMHG | SYSTOLIC BLOOD PRESSURE: 117 MMHG | WEIGHT: 162 LBS | OXYGEN SATURATION: 96 %

## 2019-01-01 VITALS
DIASTOLIC BLOOD PRESSURE: 62 MMHG | TEMPERATURE: 97 F | SYSTOLIC BLOOD PRESSURE: 133 MMHG | HEART RATE: 77 BPM | RESPIRATION RATE: 18 BRPM

## 2019-01-01 VITALS
HEIGHT: 67 IN | OXYGEN SATURATION: 96 % | BODY MASS INDEX: 25.43 KG/M2 | SYSTOLIC BLOOD PRESSURE: 148 MMHG | DIASTOLIC BLOOD PRESSURE: 78 MMHG | HEART RATE: 72 BPM | RESPIRATION RATE: 13 BRPM | TEMPERATURE: 97.6 F | WEIGHT: 162 LBS

## 2019-01-01 VITALS
DIASTOLIC BLOOD PRESSURE: 53 MMHG | HEART RATE: 67 BPM | TEMPERATURE: 96 F | RESPIRATION RATE: 18 BRPM | SYSTOLIC BLOOD PRESSURE: 112 MMHG

## 2019-01-01 DIAGNOSIS — E89.0 POSTPROCEDURAL HYPOTHYROIDISM: Chronic | ICD-10-CM

## 2019-01-01 DIAGNOSIS — Z87.891 PERSONAL HISTORY OF NICOTINE DEPENDENCE: ICD-10-CM

## 2019-01-01 DIAGNOSIS — C85.90 NON-HODGKIN LYMPHOMA, UNSPECIFIED, UNSPECIFIED SITE: ICD-10-CM

## 2019-01-01 DIAGNOSIS — Z01.818 ENCOUNTER FOR OTHER PREPROCEDURAL EXAMINATION: ICD-10-CM

## 2019-01-01 DIAGNOSIS — J95.811 POSTPROCEDURAL PNEUMOTHORAX: ICD-10-CM

## 2019-01-01 DIAGNOSIS — C85.10 UNSPECIFIED B-CELL LYMPHOMA, UNSPECIFIED SITE: ICD-10-CM

## 2019-01-01 DIAGNOSIS — R05 COUGH: ICD-10-CM

## 2019-01-01 DIAGNOSIS — R59.0 LOCALIZED ENLARGED LYMPH NODES: ICD-10-CM

## 2019-01-01 DIAGNOSIS — Z88.2 ALLERGY STATUS TO SULFONAMIDES: ICD-10-CM

## 2019-01-01 DIAGNOSIS — R91.8 OTHER NONSPECIFIC ABNORMAL FINDING OF LUNG FIELD: ICD-10-CM

## 2019-01-01 DIAGNOSIS — Z98.890 OTHER SPECIFIED POSTPROCEDURAL STATES: Chronic | ICD-10-CM

## 2019-01-01 DIAGNOSIS — E89.0 POSTPROCEDURAL HYPOTHYROIDISM: ICD-10-CM

## 2019-01-01 DIAGNOSIS — Z80.3 FAMILY HISTORY OF MALIGNANT NEOPLASM OF BREAST: ICD-10-CM

## 2019-01-01 DIAGNOSIS — C34.31 MALIGNANT NEOPLASM OF LOWER LOBE, RIGHT BRONCHUS OR LUNG: ICD-10-CM

## 2019-01-01 DIAGNOSIS — Z91.041 RADIOGRAPHIC DYE ALLERGY STATUS: ICD-10-CM
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
## 2019-01-01 DIAGNOSIS — Z98.890 OTHER SPECIFIED POSTPROCEDURAL STATES: ICD-10-CM

## 2019-01-01 DIAGNOSIS — Z88.1 ALLERGY STATUS TO OTHER ANTIBIOTIC AGENTS STATUS: ICD-10-CM

## 2019-01-01 DIAGNOSIS — Z80.0 FAMILY HISTORY OF MALIGNANT NEOPLASM OF DIGESTIVE ORGANS: ICD-10-CM

## 2019-01-01 DIAGNOSIS — R59.9 ENLARGED LYMPH NODES, UNSPECIFIED: ICD-10-CM

## 2019-01-01 DIAGNOSIS — Z88.3 ALLERGY STATUS TO OTHER ANTI-INFECTIVE AGENTS: ICD-10-CM

## 2019-01-01 DIAGNOSIS — I10 ESSENTIAL (PRIMARY) HYPERTENSION: ICD-10-CM

## 2019-01-01 DIAGNOSIS — R94.2 ABNORMAL RESULTS OF PULMONARY FUNCTION STUDIES: ICD-10-CM

## 2019-01-01 DIAGNOSIS — E03.9 HYPOTHYROIDISM, UNSPECIFIED: ICD-10-CM

## 2019-01-01 DIAGNOSIS — Z02.9 ENCOUNTER FOR ADMINISTRATIVE EXAMINATIONS, UNSPECIFIED: ICD-10-CM

## 2019-01-01 DIAGNOSIS — D64.9 ANEMIA, UNSPECIFIED: ICD-10-CM

## 2019-01-01 DIAGNOSIS — R91.1 SOLITARY PULMONARY NODULE: ICD-10-CM

## 2019-01-01 LAB
ALBUMIN SERPL ELPH-MCNC: 3.8 G/DL — SIGNIFICANT CHANGE UP (ref 3.5–5.2)
ALBUMIN SERPL ELPH-MCNC: 4 G/DL — SIGNIFICANT CHANGE UP (ref 3.5–5.2)
ALBUMIN SERPL ELPH-MCNC: 4.5 G/DL — SIGNIFICANT CHANGE UP (ref 3.5–5.2)
ALP SERPL-CCNC: 72 U/L — SIGNIFICANT CHANGE UP (ref 30–115)
ALP SERPL-CCNC: 74 U/L — SIGNIFICANT CHANGE UP (ref 30–115)
ALP SERPL-CCNC: 80 U/L — SIGNIFICANT CHANGE UP (ref 30–115)
ALT FLD-CCNC: 10 U/L — SIGNIFICANT CHANGE UP (ref 0–41)
ALT FLD-CCNC: 10 U/L — SIGNIFICANT CHANGE UP (ref 0–41)
ALT FLD-CCNC: 12 U/L — SIGNIFICANT CHANGE UP (ref 0–41)
ANION GAP SERPL CALC-SCNC: 11 MMOL/L — SIGNIFICANT CHANGE UP (ref 7–14)
ANION GAP SERPL CALC-SCNC: 11 MMOL/L — SIGNIFICANT CHANGE UP (ref 7–14)
ANION GAP SERPL CALC-SCNC: 12 MMOL/L — SIGNIFICANT CHANGE UP (ref 7–14)
ANION GAP SERPL CALC-SCNC: 15 MMOL/L — HIGH (ref 7–14)
APPEARANCE UR: CLEAR — SIGNIFICANT CHANGE UP
APTT BLD: 34.9 SEC — SIGNIFICANT CHANGE UP (ref 27–39.2)
APTT BLD: 36.4 SEC — SIGNIFICANT CHANGE UP (ref 27–39.2)
AST SERPL-CCNC: 12 U/L — SIGNIFICANT CHANGE UP (ref 0–41)
AST SERPL-CCNC: 13 U/L — SIGNIFICANT CHANGE UP (ref 0–41)
AST SERPL-CCNC: 14 U/L — SIGNIFICANT CHANGE UP (ref 0–41)
BASOPHILS # BLD AUTO: 0.04 K/UL — SIGNIFICANT CHANGE UP (ref 0–0.2)
BASOPHILS NFR BLD AUTO: 0.8 % — SIGNIFICANT CHANGE UP (ref 0–1)
BASOPHILS NFR BLD AUTO: 0.9 % — SIGNIFICANT CHANGE UP (ref 0–1)
BASOPHILS NFR BLD AUTO: 1.1 % — HIGH (ref 0–1)
BASOPHILS NFR BLD AUTO: 1.1 % — HIGH (ref 0–1)
BILIRUB SERPL-MCNC: 0.3 MG/DL — SIGNIFICANT CHANGE UP (ref 0.2–1.2)
BILIRUB SERPL-MCNC: 0.4 MG/DL — SIGNIFICANT CHANGE UP (ref 0.2–1.2)
BILIRUB SERPL-MCNC: 0.6 MG/DL — SIGNIFICANT CHANGE UP (ref 0.2–1.2)
BILIRUB UR-MCNC: NEGATIVE — SIGNIFICANT CHANGE UP
BLD GP AB SCN SERPL QL: SIGNIFICANT CHANGE UP
BUN SERPL-MCNC: 15 MG/DL — SIGNIFICANT CHANGE UP (ref 10–20)
BUN SERPL-MCNC: 15 MG/DL — SIGNIFICANT CHANGE UP (ref 10–20)
BUN SERPL-MCNC: 16 MG/DL — SIGNIFICANT CHANGE UP (ref 10–20)
BUN SERPL-MCNC: 17 MG/DL — SIGNIFICANT CHANGE UP (ref 10–20)
CALCIUM SERPL-MCNC: 9 MG/DL — SIGNIFICANT CHANGE UP (ref 8.5–10.1)
CALCIUM SERPL-MCNC: 9.4 MG/DL — SIGNIFICANT CHANGE UP (ref 8.5–10.1)
CALCIUM SERPL-MCNC: 9.5 MG/DL — SIGNIFICANT CHANGE UP (ref 8.5–10.1)
CALCIUM SERPL-MCNC: 9.9 MG/DL — SIGNIFICANT CHANGE UP (ref 8.5–10.1)
CHLORIDE SERPL-SCNC: 87 MMOL/L — LOW (ref 98–110)
CHLORIDE SERPL-SCNC: 92 MMOL/L — LOW (ref 98–110)
CHLORIDE SERPL-SCNC: 93 MMOL/L — LOW (ref 98–110)
CHLORIDE SERPL-SCNC: 93 MMOL/L — LOW (ref 98–110)
CHROM ANALY OVERALL INTERP SPEC-IMP: SIGNIFICANT CHANGE UP
CO2 SERPL-SCNC: 25 MMOL/L — SIGNIFICANT CHANGE UP (ref 17–32)
CO2 SERPL-SCNC: 26 MMOL/L — SIGNIFICANT CHANGE UP (ref 17–32)
CO2 SERPL-SCNC: 26 MMOL/L — SIGNIFICANT CHANGE UP (ref 17–32)
CO2 SERPL-SCNC: 28 MMOL/L — SIGNIFICANT CHANGE UP (ref 17–32)
COLOR SPEC: YELLOW — SIGNIFICANT CHANGE UP
CREAT SERPL-MCNC: 0.8 MG/DL — SIGNIFICANT CHANGE UP (ref 0.7–1.5)
CREAT SERPL-MCNC: 0.9 MG/DL — SIGNIFICANT CHANGE UP (ref 0.7–1.5)
CREAT SERPL-MCNC: 0.9 MG/DL — SIGNIFICANT CHANGE UP (ref 0.7–1.5)
CREAT SERPL-MCNC: 1 MG/DL — SIGNIFICANT CHANGE UP (ref 0.7–1.5)
DIFF PNL FLD: NEGATIVE — SIGNIFICANT CHANGE UP
EOSINOPHIL # BLD AUTO: 0.1 K/UL — SIGNIFICANT CHANGE UP (ref 0–0.7)
EOSINOPHIL # BLD AUTO: 0.11 K/UL — SIGNIFICANT CHANGE UP (ref 0–0.7)
EOSINOPHIL # BLD AUTO: 0.12 K/UL — SIGNIFICANT CHANGE UP (ref 0–0.7)
EOSINOPHIL # BLD AUTO: 0.16 K/UL — SIGNIFICANT CHANGE UP (ref 0–0.7)
EOSINOPHIL NFR BLD AUTO: 2.3 % — SIGNIFICANT CHANGE UP (ref 0–8)
EOSINOPHIL NFR BLD AUTO: 2.7 % — SIGNIFICANT CHANGE UP (ref 0–8)
EOSINOPHIL NFR BLD AUTO: 3.1 % — SIGNIFICANT CHANGE UP (ref 0–8)
EOSINOPHIL NFR BLD AUTO: 3.5 % — SIGNIFICANT CHANGE UP (ref 0–8)
GLUCOSE BLDC GLUCOMTR-MCNC: 85 MG/DL — SIGNIFICANT CHANGE UP (ref 70–99)
GLUCOSE SERPL-MCNC: 102 MG/DL — HIGH (ref 70–99)
GLUCOSE SERPL-MCNC: 87 MG/DL — SIGNIFICANT CHANGE UP (ref 70–99)
GLUCOSE SERPL-MCNC: 89 MG/DL — SIGNIFICANT CHANGE UP (ref 70–99)
GLUCOSE SERPL-MCNC: 89 MG/DL — SIGNIFICANT CHANGE UP (ref 70–99)
GLUCOSE UR QL: NEGATIVE — SIGNIFICANT CHANGE UP
HCT VFR BLD CALC: 33 % — LOW (ref 42–52)
HCT VFR BLD CALC: 34 % — LOW (ref 42–52)
HCT VFR BLD CALC: 35 % — LOW (ref 42–52)
HCT VFR BLD CALC: 36.8 % — LOW (ref 42–52)
HCV AB S/CO SERPL IA: 0.19 S/CO — SIGNIFICANT CHANGE UP (ref 0–0.99)
HCV AB SERPL-IMP: SIGNIFICANT CHANGE UP
HGB BLD-MCNC: 11.2 G/DL — LOW (ref 14–18)
HGB BLD-MCNC: 11.7 G/DL — LOW (ref 14–18)
HGB BLD-MCNC: 11.8 G/DL — LOW (ref 14–18)
HGB BLD-MCNC: 12.5 G/DL — LOW (ref 14–18)
IMM GRANULOCYTES NFR BLD AUTO: 0.4 % — HIGH (ref 0.1–0.3)
IMM GRANULOCYTES NFR BLD AUTO: 0.5 % — HIGH (ref 0.1–0.3)
IMM GRANULOCYTES NFR BLD AUTO: 0.6 % — HIGH (ref 0.1–0.3)
IMM GRANULOCYTES NFR BLD AUTO: 0.7 % — HIGH (ref 0.1–0.3)
INR BLD: 0.99 RATIO — SIGNIFICANT CHANGE UP (ref 0.65–1.3)
INR BLD: 1.04 RATIO — SIGNIFICANT CHANGE UP (ref 0.65–1.3)
KETONES UR-MCNC: NEGATIVE — SIGNIFICANT CHANGE UP
LEUKOCYTE ESTERASE UR-ACNC: NEGATIVE — SIGNIFICANT CHANGE UP
LYMPHOCYTES # BLD AUTO: 0.35 K/UL — LOW (ref 1.2–3.4)
LYMPHOCYTES # BLD AUTO: 0.53 K/UL — LOW (ref 1.2–3.4)
LYMPHOCYTES # BLD AUTO: 0.55 K/UL — LOW (ref 1.2–3.4)
LYMPHOCYTES # BLD AUTO: 0.72 K/UL — LOW (ref 1.2–3.4)
LYMPHOCYTES # BLD AUTO: 14.5 % — LOW (ref 20.5–51.1)
LYMPHOCYTES # BLD AUTO: 15.6 % — LOW (ref 20.5–51.1)
LYMPHOCYTES # BLD AUTO: 15.9 % — LOW (ref 20.5–51.1)
LYMPHOCYTES # BLD AUTO: 6.6 % — LOW (ref 20.5–51.1)
MAGNESIUM SERPL-MCNC: 1.7 MG/DL — LOW (ref 1.8–2.4)
MAGNESIUM SERPL-MCNC: 1.8 MG/DL — SIGNIFICANT CHANGE UP (ref 1.8–2.4)
MCHC RBC-ENTMCNC: 28.7 PG — SIGNIFICANT CHANGE UP (ref 27–31)
MCHC RBC-ENTMCNC: 28.8 PG — SIGNIFICANT CHANGE UP (ref 27–31)
MCHC RBC-ENTMCNC: 29.1 PG — SIGNIFICANT CHANGE UP (ref 27–31)
MCHC RBC-ENTMCNC: 29.1 PG — SIGNIFICANT CHANGE UP (ref 27–31)
MCHC RBC-ENTMCNC: 33.7 G/DL — SIGNIFICANT CHANGE UP (ref 32–37)
MCHC RBC-ENTMCNC: 33.9 G/DL — SIGNIFICANT CHANGE UP (ref 32–37)
MCHC RBC-ENTMCNC: 34 G/DL — SIGNIFICANT CHANGE UP (ref 32–37)
MCHC RBC-ENTMCNC: 34.4 G/DL — SIGNIFICANT CHANGE UP (ref 32–37)
MCV RBC AUTO: 84.6 FL — SIGNIFICANT CHANGE UP (ref 80–94)
MCV RBC AUTO: 84.8 FL — SIGNIFICANT CHANGE UP (ref 80–94)
MCV RBC AUTO: 85.2 FL — SIGNIFICANT CHANGE UP (ref 80–94)
MCV RBC AUTO: 85.6 FL — SIGNIFICANT CHANGE UP (ref 80–94)
MONOCYTES # BLD AUTO: 0.42 K/UL — SIGNIFICANT CHANGE UP (ref 0.1–0.6)
MONOCYTES # BLD AUTO: 0.44 K/UL — SIGNIFICANT CHANGE UP (ref 0.1–0.6)
MONOCYTES # BLD AUTO: 0.47 K/UL — SIGNIFICANT CHANGE UP (ref 0.1–0.6)
MONOCYTES # BLD AUTO: 0.58 K/UL — SIGNIFICANT CHANGE UP (ref 0.1–0.6)
MONOCYTES NFR BLD AUTO: 11.5 % — HIGH (ref 1.7–9.3)
MONOCYTES NFR BLD AUTO: 12.5 % — HIGH (ref 1.7–9.3)
MONOCYTES NFR BLD AUTO: 12.8 % — HIGH (ref 1.7–9.3)
MONOCYTES NFR BLD AUTO: 8.8 % — SIGNIFICANT CHANGE UP (ref 1.7–9.3)
NEUTROPHILS # BLD AUTO: 2.36 K/UL — SIGNIFICANT CHANGE UP (ref 1.4–6.5)
NEUTROPHILS # BLD AUTO: 2.55 K/UL — SIGNIFICANT CHANGE UP (ref 1.4–6.5)
NEUTROPHILS # BLD AUTO: 3 K/UL — SIGNIFICANT CHANGE UP (ref 1.4–6.5)
NEUTROPHILS # BLD AUTO: 4.32 K/UL — SIGNIFICANT CHANGE UP (ref 1.4–6.5)
NEUTROPHILS NFR BLD AUTO: 66.2 % — SIGNIFICANT CHANGE UP (ref 42.2–75.2)
NEUTROPHILS NFR BLD AUTO: 67.1 % — SIGNIFICANT CHANGE UP (ref 42.2–75.2)
NEUTROPHILS NFR BLD AUTO: 69.7 % — SIGNIFICANT CHANGE UP (ref 42.2–75.2)
NEUTROPHILS NFR BLD AUTO: 81.1 % — HIGH (ref 42.2–75.2)
NITRITE UR-MCNC: NEGATIVE — SIGNIFICANT CHANGE UP
NON-GYNECOLOGICAL CYTOLOGY STUDY: SIGNIFICANT CHANGE UP
NRBC # BLD: 0 /100 WBCS — SIGNIFICANT CHANGE UP (ref 0–0)
PH UR: 7 — SIGNIFICANT CHANGE UP (ref 5–8)
PLATELET # BLD AUTO: 226 K/UL — SIGNIFICANT CHANGE UP (ref 130–400)
PLATELET # BLD AUTO: 236 K/UL — SIGNIFICANT CHANGE UP (ref 130–400)
PLATELET # BLD AUTO: 236 K/UL — SIGNIFICANT CHANGE UP (ref 130–400)
PLATELET # BLD AUTO: 244 K/UL — SIGNIFICANT CHANGE UP (ref 130–400)
POTASSIUM SERPL-MCNC: 3.8 MMOL/L — SIGNIFICANT CHANGE UP (ref 3.5–5)
POTASSIUM SERPL-MCNC: 4.4 MMOL/L — SIGNIFICANT CHANGE UP (ref 3.5–5)
POTASSIUM SERPL-SCNC: 3.8 MMOL/L — SIGNIFICANT CHANGE UP (ref 3.5–5)
POTASSIUM SERPL-SCNC: 4.4 MMOL/L — SIGNIFICANT CHANGE UP (ref 3.5–5)
PROT SERPL-MCNC: 5.7 G/DL — LOW (ref 6–8)
PROT SERPL-MCNC: 6 G/DL — SIGNIFICANT CHANGE UP (ref 6–8)
PROT SERPL-MCNC: 6.9 G/DL — SIGNIFICANT CHANGE UP (ref 6–8)
PROT UR-MCNC: SIGNIFICANT CHANGE UP
PROTHROM AB SERPL-ACNC: 11.4 SEC — SIGNIFICANT CHANGE UP (ref 9.95–12.87)
PROTHROM AB SERPL-ACNC: 12 SEC — SIGNIFICANT CHANGE UP (ref 9.95–12.87)
RBC # BLD: 3.89 M/UL — LOW (ref 4.7–6.1)
RBC # BLD: 4.02 M/UL — LOW (ref 4.7–6.1)
RBC # BLD: 4.11 M/UL — LOW (ref 4.7–6.1)
RBC # BLD: 4.3 M/UL — LOW (ref 4.7–6.1)
RBC # FLD: 12.7 % — SIGNIFICANT CHANGE UP (ref 11.5–14.5)
RBC # FLD: 12.8 % — SIGNIFICANT CHANGE UP (ref 11.5–14.5)
RBC # FLD: 12.9 % — SIGNIFICANT CHANGE UP (ref 11.5–14.5)
RBC # FLD: 12.9 % — SIGNIFICANT CHANGE UP (ref 11.5–14.5)
SODIUM SERPL-SCNC: 128 MMOL/L — LOW (ref 135–146)
SODIUM SERPL-SCNC: 128 MMOL/L — LOW (ref 135–146)
SODIUM SERPL-SCNC: 130 MMOL/L — LOW (ref 135–146)
SODIUM SERPL-SCNC: 133 MMOL/L — LOW (ref 135–146)
SP GR SPEC: 1.02 — SIGNIFICANT CHANGE UP (ref 1.01–1.02)
SURGICAL PATHOLOGY STUDY: SIGNIFICANT CHANGE UP
TM INTERPRETATION: SIGNIFICANT CHANGE UP
UROBILINOGEN FLD QL: SIGNIFICANT CHANGE UP
WBC # BLD: 3.52 K/UL — LOW (ref 4.8–10.8)
WBC # BLD: 3.66 K/UL — LOW (ref 4.8–10.8)
WBC # BLD: 4.53 K/UL — LOW (ref 4.8–10.8)
WBC # BLD: 5.32 K/UL — SIGNIFICANT CHANGE UP (ref 4.8–10.8)
WBC # FLD AUTO: 3.52 K/UL — LOW (ref 4.8–10.8)
WBC # FLD AUTO: 3.66 K/UL — LOW (ref 4.8–10.8)
WBC # FLD AUTO: 4.53 K/UL — LOW (ref 4.8–10.8)
WBC # FLD AUTO: 5.32 K/UL — SIGNIFICANT CHANGE UP (ref 4.8–10.8)

## 2019-01-01 PROCEDURE — 88360 TUMOR IMMUNOHISTOCHEM/MANUAL: CPT | Mod: 26

## 2019-01-01 PROCEDURE — 88341 IMHCHEM/IMCYTCHM EA ADD ANTB: CPT | Mod: 26,59

## 2019-01-01 PROCEDURE — 93000 ELECTROCARDIOGRAM COMPLETE: CPT

## 2019-01-01 PROCEDURE — 71046 X-RAY EXAM CHEST 2 VIEWS: CPT | Mod: 26

## 2019-01-01 PROCEDURE — 99223 1ST HOSP IP/OBS HIGH 75: CPT | Mod: AI

## 2019-01-01 PROCEDURE — 88307 TISSUE EXAM BY PATHOLOGIST: CPT | Mod: 26

## 2019-01-01 PROCEDURE — 32662 THORACOSCOPY W/MEDIAST EXC: CPT

## 2019-01-01 PROCEDURE — 32557 INSERT CATH PLEURA W/ IMAGE: CPT | Mod: RT

## 2019-01-01 PROCEDURE — 88305 TISSUE EXAM BY PATHOLOGIST: CPT | Mod: 26

## 2019-01-01 PROCEDURE — 88342 IMHCHEM/IMCYTCHM 1ST ANTB: CPT | Mod: 26,59

## 2019-01-01 PROCEDURE — 99239 HOSP IP/OBS DSCHRG MGMT >30: CPT

## 2019-01-01 PROCEDURE — 71045 X-RAY EXAM CHEST 1 VIEW: CPT | Mod: 26,77

## 2019-01-01 PROCEDURE — 71045 X-RAY EXAM CHEST 1 VIEW: CPT | Mod: 26,76

## 2019-01-01 PROCEDURE — 77012 CT SCAN FOR NEEDLE BIOPSY: CPT | Mod: 26,59

## 2019-01-01 PROCEDURE — 31622 DX BRONCHOSCOPE/WASH: CPT

## 2019-01-01 PROCEDURE — 71045 X-RAY EXAM CHEST 1 VIEW: CPT | Mod: 26

## 2019-01-01 PROCEDURE — 88333 PATH CONSLTJ SURG CYTO XM 1: CPT | Mod: 26

## 2019-01-01 PROCEDURE — 99223 1ST HOSP IP/OBS HIGH 75: CPT

## 2019-01-01 PROCEDURE — 70553 MRI BRAIN STEM W/O & W/DYE: CPT | Mod: 26

## 2019-01-01 PROCEDURE — 99024 POSTOP FOLLOW-UP VISIT: CPT

## 2019-01-01 PROCEDURE — 88344 IMHCHEM/IMCYTCHM EA MLT ANTB: CPT | Mod: 26,59

## 2019-01-01 PROCEDURE — 88189 FLOWCYTOMETRY/READ 16 & >: CPT

## 2019-01-01 PROCEDURE — 78815 PET IMAGE W/CT SKULL-THIGH: CPT | Mod: 26,PI

## 2019-01-01 PROCEDURE — 99204 OFFICE O/P NEW MOD 45 MIN: CPT

## 2019-01-01 PROCEDURE — 99152 MOD SED SAME PHYS/QHP 5/>YRS: CPT

## 2019-01-01 PROCEDURE — 93010 ELECTROCARDIOGRAM REPORT: CPT

## 2019-01-01 PROCEDURE — 71260 CT THORAX DX C+: CPT | Mod: 26

## 2019-01-01 PROCEDURE — 99214 OFFICE O/P EST MOD 30 MIN: CPT

## 2019-01-01 RX ORDER — ACETAMINOPHEN 500 MG
1000 TABLET ORAL ONCE
Refills: 0 | Status: DISCONTINUED | OUTPATIENT
Start: 2019-01-01 | End: 2019-01-01

## 2019-01-01 RX ORDER — ONDANSETRON 8 MG/1
4 TABLET, FILM COATED ORAL EVERY 6 HOURS
Refills: 0 | Status: DISCONTINUED | OUTPATIENT
Start: 2019-01-01 | End: 2019-01-01

## 2019-01-01 RX ORDER — DOCUSATE SODIUM 100 MG
100 CAPSULE ORAL THREE TIMES A DAY
Refills: 0 | Status: DISCONTINUED | OUTPATIENT
Start: 2019-01-01 | End: 2019-01-01

## 2019-01-01 RX ORDER — ACETAMINOPHEN 500 MG
2 TABLET ORAL
Qty: 0 | Refills: 0 | DISCHARGE
Start: 2019-01-01

## 2019-01-01 RX ORDER — MORPHINE SULFATE 50 MG/1
30 CAPSULE, EXTENDED RELEASE ORAL
Refills: 0 | Status: DISCONTINUED | OUTPATIENT
Start: 2019-01-01 | End: 2019-01-01

## 2019-01-01 RX ORDER — HYDROMORPHONE HYDROCHLORIDE 2 MG/ML
0.5 INJECTION INTRAMUSCULAR; INTRAVENOUS; SUBCUTANEOUS
Refills: 0 | Status: DISCONTINUED | OUTPATIENT
Start: 2019-01-01 | End: 2019-01-01

## 2019-01-01 RX ORDER — AMOXICILLIN 500 MG/1
500 TABLET, FILM COATED ORAL
Qty: 15 | Refills: 0 | Status: DISCONTINUED | COMMUNITY
Start: 2018-03-09 | End: 2019-01-01

## 2019-01-01 RX ORDER — SODIUM CHLORIDE 9 MG/ML
1000 INJECTION, SOLUTION INTRAVENOUS
Refills: 0 | Status: DISCONTINUED | OUTPATIENT
Start: 2019-01-01 | End: 2019-01-01

## 2019-01-01 RX ORDER — PANTOPRAZOLE SODIUM 20 MG/1
40 TABLET, DELAYED RELEASE ORAL
Refills: 0 | Status: DISCONTINUED | OUTPATIENT
Start: 2019-01-01 | End: 2019-01-01

## 2019-01-01 RX ORDER — METOPROLOL TARTRATE 50 MG
100 TABLET ORAL DAILY
Refills: 0 | Status: DISCONTINUED | OUTPATIENT
Start: 2019-01-01 | End: 2019-01-01

## 2019-01-01 RX ORDER — BUPIVACAINE 13.3 MG/ML
266 INJECTION, SUSPENSION, LIPOSOMAL INFILTRATION ONCE
Refills: 0 | Status: DISCONTINUED | OUTPATIENT
Start: 2019-01-01 | End: 2019-01-01

## 2019-01-01 RX ORDER — INFLUENZA VIRUS VACCINE 15; 15; 15; 15 UG/.5ML; UG/.5ML; UG/.5ML; UG/.5ML
0.5 SUSPENSION INTRAMUSCULAR ONCE
Refills: 0 | Status: COMPLETED | OUTPATIENT
Start: 2019-01-01 | End: 2019-01-01

## 2019-01-01 RX ORDER — PREDNISONE 20 MG/1
20 TABLET ORAL DAILY
Qty: 14 | Refills: 0 | Status: DISCONTINUED | COMMUNITY
Start: 2018-01-31 | End: 2019-01-01

## 2019-01-01 RX ORDER — LOSARTAN/HYDROCHLOROTHIAZIDE 100MG-25MG
1 TABLET ORAL
Qty: 0 | Refills: 0 | DISCHARGE

## 2019-01-01 RX ORDER — LISINOPRIL AND HYDROCHLOROTHIAZIDE TABLETS 20; 25 MG/1; MG/1
20-25 TABLET ORAL
Refills: 0 | Status: ACTIVE | COMMUNITY

## 2019-01-01 RX ORDER — PANTOPRAZOLE 40 MG/1
40 TABLET, DELAYED RELEASE ORAL
Qty: 60 | Refills: 3 | Status: DISCONTINUED | COMMUNITY
Start: 2019-02-27 | End: 2019-01-01

## 2019-01-01 RX ORDER — LEVOTHYROXINE SODIUM 125 MCG
1 TABLET ORAL
Qty: 0 | Refills: 0 | DISCHARGE

## 2019-01-01 RX ORDER — PREDNISONE 10 MG/1
10 TABLET ORAL
Qty: 6 | Refills: 0 | Status: DISCONTINUED | COMMUNITY
Start: 2017-11-01 | End: 2019-01-01

## 2019-01-01 RX ORDER — IBUPROFEN 600 MG/1
600 TABLET, FILM COATED ORAL
Qty: 16 | Refills: 0 | Status: DISCONTINUED | COMMUNITY
Start: 2018-03-09 | End: 2019-01-01

## 2019-01-01 RX ORDER — CHLORHEXIDINE GLUCONATE 213 G/1000ML
1 SOLUTION TOPICAL
Refills: 0 | Status: DISCONTINUED | OUTPATIENT
Start: 2019-01-01 | End: 2019-01-01

## 2019-01-01 RX ORDER — HYDROCHLOROTHIAZIDE 25 MG
25 TABLET ORAL DAILY
Refills: 0 | Status: DISCONTINUED | OUTPATIENT
Start: 2019-01-01 | End: 2019-01-01

## 2019-01-01 RX ORDER — OMEPRAZOLE 40 MG/1
40 CAPSULE, DELAYED RELEASE ORAL TWICE DAILY
Qty: 60 | Refills: 3 | Status: DISCONTINUED | COMMUNITY
Start: 2017-11-08 | End: 2019-01-01

## 2019-01-01 RX ORDER — LISINOPRIL 2.5 MG/1
20 TABLET ORAL DAILY
Refills: 0 | Status: DISCONTINUED | OUTPATIENT
Start: 2019-01-01 | End: 2019-01-01

## 2019-01-01 RX ORDER — RANITIDINE 300 MG/1
300 TABLET ORAL TWICE DAILY
Qty: 60 | Refills: 2 | Status: DISCONTINUED | COMMUNITY
Start: 2019-05-22 | End: 2019-01-01

## 2019-01-01 RX ORDER — VALACYCLOVIR 1 G/1
1 TABLET, FILM COATED ORAL
Qty: 28 | Refills: 0 | Status: DISCONTINUED | COMMUNITY
Start: 2018-10-24 | End: 2019-01-01

## 2019-01-01 RX ORDER — SENNA PLUS 8.6 MG/1
2 TABLET ORAL AT BEDTIME
Refills: 0 | Status: DISCONTINUED | OUTPATIENT
Start: 2019-01-01 | End: 2019-01-01

## 2019-01-01 RX ORDER — SODIUM CHLORIDE 9 MG/ML
1000 INJECTION INTRAMUSCULAR; INTRAVENOUS; SUBCUTANEOUS
Refills: 0 | Status: DISCONTINUED | OUTPATIENT
Start: 2019-01-01 | End: 2019-01-01

## 2019-01-01 RX ORDER — HEPARIN SODIUM 5000 [USP'U]/ML
5000 INJECTION INTRAVENOUS; SUBCUTANEOUS EVERY 8 HOURS
Refills: 0 | Status: DISCONTINUED | OUTPATIENT
Start: 2019-01-01 | End: 2019-01-01

## 2019-01-01 RX ORDER — NYSTATIN 100000 [USP'U]/ML
100000 SUSPENSION ORAL 3 TIMES DAILY
Qty: 450 | Refills: 0 | Status: DISCONTINUED | COMMUNITY
Start: 2018-04-11 | End: 2019-01-01

## 2019-01-01 RX ORDER — GABAPENTIN 100 MG/1
100 CAPSULE ORAL
Qty: 90 | Refills: 0 | Status: DISCONTINUED | COMMUNITY
Start: 2017-07-23 | End: 2019-01-01

## 2019-01-01 RX ORDER — METOPROLOL TARTRATE 50 MG
1 TABLET ORAL
Qty: 0 | Refills: 0 | DISCHARGE

## 2019-01-01 RX ORDER — ONDANSETRON 8 MG/1
4 TABLET, FILM COATED ORAL ONCE
Refills: 0 | Status: DISCONTINUED | OUTPATIENT
Start: 2019-01-01 | End: 2019-01-01

## 2019-01-01 RX ORDER — METHYLPREDNISOLONE 4 MG/1
4 TABLET ORAL
Qty: 1 | Refills: 0 | Status: DISCONTINUED | COMMUNITY
Start: 2017-11-08 | End: 2019-01-01

## 2019-01-01 RX ORDER — OXYCODONE AND ACETAMINOPHEN 5; 325 MG/1; MG/1
1 TABLET ORAL EVERY 6 HOURS
Refills: 0 | Status: DISCONTINUED | OUTPATIENT
Start: 2019-01-01 | End: 2019-01-01

## 2019-01-01 RX ORDER — LOSARTAN POTASSIUM 100 MG/1
100 TABLET, FILM COATED ORAL DAILY
Refills: 0 | Status: DISCONTINUED | OUTPATIENT
Start: 2019-01-01 | End: 2019-01-01

## 2019-01-01 RX ORDER — ACETAMINOPHEN 500 MG
650 TABLET ORAL EVERY 6 HOURS
Refills: 0 | Status: DISCONTINUED | OUTPATIENT
Start: 2019-01-01 | End: 2019-01-01

## 2019-01-01 RX ORDER — METHYLPREDNISOLONE 4 MG/1
4 TABLET ORAL
Qty: 1 | Refills: 0 | Status: DISCONTINUED | COMMUNITY
Start: 2018-10-24 | End: 2019-01-01

## 2019-01-01 RX ORDER — RANITIDINE 150 MG/1
150 TABLET ORAL
Qty: 30 | Refills: 2 | Status: DISCONTINUED | COMMUNITY
Start: 2017-11-08 | End: 2019-01-01

## 2019-01-01 RX ORDER — INFLUENZA VIRUS VACCINE 15; 15; 15; 15 UG/.5ML; UG/.5ML; UG/.5ML; UG/.5ML
0.5 SUSPENSION INTRAMUSCULAR ONCE
Refills: 0 | Status: DISCONTINUED | OUTPATIENT
Start: 2019-01-01 | End: 2019-01-01

## 2019-01-01 RX ORDER — CEFAZOLIN SODIUM 1 G
2000 VIAL (EA) INJECTION EVERY 8 HOURS
Refills: 0 | Status: COMPLETED | OUTPATIENT
Start: 2019-01-01 | End: 2019-01-01

## 2019-01-01 RX ORDER — LEVOTHYROXINE SODIUM 125 MCG
150 TABLET ORAL DAILY
Refills: 0 | Status: DISCONTINUED | OUTPATIENT
Start: 2019-01-01 | End: 2019-01-01

## 2019-01-01 RX ORDER — RANITIDINE HYDROCHLORIDE 150 MG/1
1 TABLET, FILM COATED ORAL
Qty: 0 | Refills: 0 | DISCHARGE

## 2019-01-01 RX ADMIN — Medication 650 MILLIGRAM(S): at 05:05

## 2019-01-01 RX ADMIN — HEPARIN SODIUM 5000 UNIT(S): 5000 INJECTION INTRAVENOUS; SUBCUTANEOUS at 05:04

## 2019-01-01 RX ADMIN — Medication 150 MICROGRAM(S): at 06:18

## 2019-01-01 RX ADMIN — PANTOPRAZOLE SODIUM 40 MILLIGRAM(S): 20 TABLET, DELAYED RELEASE ORAL at 06:17

## 2019-01-01 RX ADMIN — SODIUM CHLORIDE 75 MILLILITER(S): 9 INJECTION INTRAMUSCULAR; INTRAVENOUS; SUBCUTANEOUS at 10:42

## 2019-01-01 RX ADMIN — Medication 650 MILLIGRAM(S): at 13:09

## 2019-01-01 RX ADMIN — Medication 100 MILLIGRAM(S): at 22:27

## 2019-01-01 RX ADMIN — SODIUM CHLORIDE 50 MILLILITER(S): 9 INJECTION, SOLUTION INTRAVENOUS at 13:58

## 2019-01-01 RX ADMIN — SENNA PLUS 2 TABLET(S): 8.6 TABLET ORAL at 22:27

## 2019-01-01 RX ADMIN — Medication 650 MILLIGRAM(S): at 14:05

## 2019-01-01 RX ADMIN — HYDROMORPHONE HYDROCHLORIDE 0.5 MILLIGRAM(S): 2 INJECTION INTRAMUSCULAR; INTRAVENOUS; SUBCUTANEOUS at 13:28

## 2019-01-01 RX ADMIN — HYDROMORPHONE HYDROCHLORIDE 0.5 MILLIGRAM(S): 2 INJECTION INTRAMUSCULAR; INTRAVENOUS; SUBCUTANEOUS at 12:58

## 2019-01-01 RX ADMIN — Medication 25 MILLIGRAM(S): at 05:04

## 2019-01-01 RX ADMIN — HEPARIN SODIUM 5000 UNIT(S): 5000 INJECTION INTRAVENOUS; SUBCUTANEOUS at 22:46

## 2019-01-01 RX ADMIN — HEPARIN SODIUM 5000 UNIT(S): 5000 INJECTION INTRAVENOUS; SUBCUTANEOUS at 13:09

## 2019-01-01 RX ADMIN — Medication 100 MILLIGRAM(S): at 05:36

## 2019-01-01 RX ADMIN — Medication 100 MILLIGRAM(S): at 05:03

## 2019-01-01 RX ADMIN — HEPARIN SODIUM 5000 UNIT(S): 5000 INJECTION INTRAVENOUS; SUBCUTANEOUS at 22:27

## 2019-01-01 RX ADMIN — HYDROMORPHONE HYDROCHLORIDE 0.5 MILLIGRAM(S): 2 INJECTION INTRAMUSCULAR; INTRAVENOUS; SUBCUTANEOUS at 13:13

## 2019-01-01 RX ADMIN — Medication 650 MILLIGRAM(S): at 01:04

## 2019-01-01 RX ADMIN — PANTOPRAZOLE SODIUM 40 MILLIGRAM(S): 20 TABLET, DELAYED RELEASE ORAL at 06:52

## 2019-01-01 RX ADMIN — Medication 25 MILLIGRAM(S): at 05:37

## 2019-01-01 RX ADMIN — Medication 100 MILLIGRAM(S): at 14:12

## 2019-01-01 RX ADMIN — HYDROMORPHONE HYDROCHLORIDE 0.5 MILLIGRAM(S): 2 INJECTION INTRAMUSCULAR; INTRAVENOUS; SUBCUTANEOUS at 13:14

## 2019-01-01 RX ADMIN — Medication 150 MICROGRAM(S): at 06:52

## 2019-01-01 RX ADMIN — HEPARIN SODIUM 5000 UNIT(S): 5000 INJECTION INTRAVENOUS; SUBCUTANEOUS at 14:04

## 2019-01-01 RX ADMIN — LISINOPRIL 20 MILLIGRAM(S): 2.5 TABLET ORAL at 05:04

## 2019-01-01 RX ADMIN — Medication 100 MILLIGRAM(S): at 14:04

## 2019-01-01 RX ADMIN — LOSARTAN POTASSIUM 100 MILLIGRAM(S): 100 TABLET, FILM COATED ORAL at 06:18

## 2019-01-01 RX ADMIN — Medication 650 MILLIGRAM(S): at 21:58

## 2019-01-01 RX ADMIN — Medication 650 MILLIGRAM(S): at 17:01

## 2019-01-01 RX ADMIN — Medication 100 MILLIGRAM(S): at 06:18

## 2019-01-01 RX ADMIN — Medication 100 MILLIGRAM(S): at 22:48

## 2019-01-01 RX ADMIN — MORPHINE SULFATE 30 MILLILITER(S): 50 CAPSULE, EXTENDED RELEASE ORAL at 13:00

## 2019-01-01 RX ADMIN — Medication 650 MILLIGRAM(S): at 17:05

## 2019-01-01 RX ADMIN — HEPARIN SODIUM 5000 UNIT(S): 5000 INJECTION INTRAVENOUS; SUBCUTANEOUS at 06:18

## 2019-01-01 RX ADMIN — Medication 650 MILLIGRAM(S): at 00:34

## 2019-01-01 RX ADMIN — HEPARIN SODIUM 5000 UNIT(S): 5000 INJECTION INTRAVENOUS; SUBCUTANEOUS at 14:12

## 2019-01-01 RX ADMIN — Medication 650 MILLIGRAM(S): at 20:47

## 2019-01-01 RX ADMIN — Medication 650 MILLIGRAM(S): at 20:43

## 2019-01-01 RX ADMIN — LOSARTAN POTASSIUM 100 MILLIGRAM(S): 100 TABLET, FILM COATED ORAL at 05:37

## 2019-01-01 RX ADMIN — CHLORHEXIDINE GLUCONATE 1 APPLICATION(S): 213 SOLUTION TOPICAL at 05:05

## 2019-01-01 RX ADMIN — Medication 25 MILLIGRAM(S): at 06:18

## 2019-01-01 RX ADMIN — PANTOPRAZOLE SODIUM 40 MILLIGRAM(S): 20 TABLET, DELAYED RELEASE ORAL at 13:08

## 2019-01-01 RX ADMIN — HEPARIN SODIUM 5000 UNIT(S): 5000 INJECTION INTRAVENOUS; SUBCUTANEOUS at 22:21

## 2019-01-01 RX ADMIN — SODIUM CHLORIDE 75 MILLILITER(S): 9 INJECTION INTRAMUSCULAR; INTRAVENOUS; SUBCUTANEOUS at 06:48

## 2019-01-01 RX ADMIN — Medication 650 MILLIGRAM(S): at 21:13

## 2019-01-01 RX ADMIN — HEPARIN SODIUM 5000 UNIT(S): 5000 INJECTION INTRAVENOUS; SUBCUTANEOUS at 05:37

## 2019-01-01 RX ADMIN — Medication 100 MILLIGRAM(S): at 05:04

## 2019-01-01 RX ADMIN — Medication 150 MICROGRAM(S): at 05:04

## 2019-01-01 RX ADMIN — SODIUM CHLORIDE 60 MILLILITER(S): 9 INJECTION, SOLUTION INTRAVENOUS at 12:59

## 2019-01-01 RX ADMIN — Medication 100 MILLIGRAM(S): at 22:21

## 2019-01-01 RX ADMIN — ONDANSETRON 4 MILLIGRAM(S): 8 TABLET, FILM COATED ORAL at 18:05

## 2019-01-01 RX ADMIN — CHLORHEXIDINE GLUCONATE 1 APPLICATION(S): 213 SOLUTION TOPICAL at 06:18

## 2019-02-13 ENCOUNTER — APPOINTMENT (OUTPATIENT)
Dept: OTOLARYNGOLOGY | Facility: CLINIC | Age: 74
End: 2019-02-13

## 2019-02-27 ENCOUNTER — APPOINTMENT (OUTPATIENT)
Dept: OTOLARYNGOLOGY | Facility: CLINIC | Age: 74
End: 2019-02-27
Payer: MEDICARE

## 2019-02-27 DIAGNOSIS — R49.0 DYSPHONIA: ICD-10-CM

## 2019-02-27 PROCEDURE — 99212 OFFICE O/P EST SF 10 MIN: CPT | Mod: 25

## 2019-02-27 PROCEDURE — 31575 DIAGNOSTIC LARYNGOSCOPY: CPT

## 2019-02-27 NOTE — ASSESSMENT
[FreeTextEntry1] : Pt was counselled on a low acid acid and appropriate dietary modifications.\par \par

## 2019-02-27 NOTE — PROCEDURE
[Lesion] : lesion identified by mirror examination needing further evaluation [Topical Lidocaine] : topical lidocaine [Oxymetazoline HCl] : oxymetazoline HCl [Flexible Endoscope] : examined with the flexible endoscope [Lesion(s)] : no lesions [True Vocal Cords Erythematous] : bilateral true vocal cord edema [Glottis Arytenoid Cartilages Erythema] : bilateral arytenoid ~M erythema [Arytenoid Edema ___ /4] : arytenoid edema [unfilled]U/4 [Arytenoid Erythema ___ /4] : arytenoid erythema [unfilled]U/4 [Normal] : posterior cricoid area had healthy pink mucosa in the interarytenoid area and the esophageal inlet [de-identified] : no papillomas

## 2019-02-27 NOTE — HISTORY OF PRESENT ILLNESS
[FreeTextEntry1] : Patient following up on laryngeal edema. Patient continues to have hoarseness. Quit smoking for 3 months.

## 2019-05-20 ENCOUNTER — LABORATORY RESULT (OUTPATIENT)
Age: 74
End: 2019-05-20

## 2019-05-20 ENCOUNTER — APPOINTMENT (OUTPATIENT)
Dept: HEMATOLOGY ONCOLOGY | Facility: CLINIC | Age: 74
End: 2019-05-20

## 2019-05-20 VITALS
BODY MASS INDEX: 27.32 KG/M2 | SYSTOLIC BLOOD PRESSURE: 121 MMHG | WEIGHT: 170 LBS | DIASTOLIC BLOOD PRESSURE: 58 MMHG | TEMPERATURE: 96.6 F | HEART RATE: 65 BPM | HEIGHT: 66 IN | RESPIRATION RATE: 14 BRPM

## 2019-05-20 DIAGNOSIS — F17.200 NICOTINE DEPENDENCE, UNSPECIFIED, UNCOMPLICATED: ICD-10-CM

## 2019-05-20 LAB
ALBUMIN SERPL ELPH-MCNC: 4.4 G/DL
ALP BLD-CCNC: 64 U/L
ALT SERPL-CCNC: 16 U/L
ANION GAP SERPL CALC-SCNC: 11 MMOL/L
AST SERPL-CCNC: 20 U/L
BILIRUB SERPL-MCNC: 0.4 MG/DL
BUN SERPL-MCNC: 13 MG/DL
CALCIUM SERPL-MCNC: 9.5 MG/DL
CHLORIDE SERPL-SCNC: 88 MMOL/L
CO2 SERPL-SCNC: 28 MMOL/L
CREAT SERPL-MCNC: 1.1 MG/DL
GLUCOSE SERPL-MCNC: 78 MG/DL
HCT VFR BLD CALC: 37.4 %
HGB BLD-MCNC: 12.7 G/DL
LDH SERPL-CCNC: 167 U/L
MCHC RBC-ENTMCNC: 28.9 PG
MCHC RBC-ENTMCNC: 34 G/DL
MCV RBC AUTO: 85 FL
PLATELET # BLD AUTO: 198 K/UL
PMV BLD: 9.6 FL
POTASSIUM SERPL-SCNC: 4.7 MMOL/L
PROT SERPL-MCNC: 6.5 G/DL
RBC # BLD: 4.4 M/UL
RBC # FLD: 13 %
SODIUM SERPL-SCNC: 127 MMOL/L
WBC # FLD AUTO: 4 K/UL

## 2019-05-20 NOTE — ASSESSMENT
[FreeTextEntry1] : Small lymphocytic lymphoma, clinically stable.\par \par We will repeat the blood work including a CBC, CMP, LDH. I do not feel that a CT scan is needed at this time.\par If all stable, he will be seen again in 6 months for follow up.\par \par All questions answered.

## 2019-05-20 NOTE — REVIEW OF SYSTEMS
[Recent Change In Weight] : ~T recent weight change [Joint Pain] : joint pain [Insomnia] : insomnia [Negative] : Heme/Lymph [FreeTextEntry2] : Gained weight

## 2019-05-20 NOTE — CONSULT LETTER
[Dear  ___] : Dear  [unfilled], [Courtesy Letter:] : I had the pleasure of seeing your patient, [unfilled], in my office today. [Please see my note below.] : Please see my note below. [Consult Closing:] : Thank you very much for allowing me to participate in the care of this patient.  If you have any questions, please do not hesitate to contact me. [Sincerely,] : Sincerely, [FreeTextEntry2] : Dr. Bertram Lopez [FreeTextEntry3] : Dr. TAMMY Carney

## 2019-05-20 NOTE — PHYSICAL EXAM
[Fully active, able to carry on all pre-disease performance without restriction] : Status 0 - Fully active, able to carry on all pre-disease performance without restriction [Normal] : affect appropriate [de-identified] : Small left cervical LN present, soft to firm in consistency, movable. [de-identified] : Mild arthritic changes [de-identified] : However, he has seborrheic keratotic areas.

## 2019-05-20 NOTE — HISTORY OF PRESENT ILLNESS
[Disease: _____________________] : Disease: [unfilled] [de-identified] : The patient is coming for his regularly scheduled follow up for his small lymphocytic lymphoma.\par Presently, he is denying any new particular complaints. Specifically no fever or night sweats, appetite improved (has given up smoking x 6 months). No problems with urine or bowel movements. He is up to date with his screenings; colonoscopy about 2 weeks ago was negative.\par No new medications. \par No new cancer cases in the family. \par He has been followed also by ENT for acid reflux. He was placed on omeprazole but he is not taking it regularly.\par   [de-identified] : Small lymphocytic lymphoma

## 2019-05-22 ENCOUNTER — APPOINTMENT (OUTPATIENT)
Dept: OTOLARYNGOLOGY | Facility: CLINIC | Age: 74
End: 2019-05-22
Payer: MEDICARE

## 2019-05-22 DIAGNOSIS — Z87.891 PERSONAL HISTORY OF NICOTINE DEPENDENCE: ICD-10-CM

## 2019-05-22 DIAGNOSIS — D14.1 BENIGN NEOPLASM OF LARYNX: ICD-10-CM

## 2019-05-22 DIAGNOSIS — J38.4 EDEMA OF LARYNX: ICD-10-CM

## 2019-05-22 PROCEDURE — 31575 DIAGNOSTIC LARYNGOSCOPY: CPT

## 2019-05-22 PROCEDURE — 99212 OFFICE O/P EST SF 10 MIN: CPT | Mod: 25

## 2019-05-22 NOTE — PROCEDURE
[Hoarseness] : hoarseness not clearly evaluated by indirect laryngoscopy [Lesion] : lesion identified by mirror examination needing further evaluation [Topical Lidocaine] : topical lidocaine [Oxymetazoline HCl] : oxymetazoline HCl [Flexible Endoscope] : examined with the flexible endoscope [Epiglottal Inflammation] : ~M edematous bilaterally [Glottis Arytenoid Cartilages Erythema] : bilateral arytenoid ~M erythema [Arytenoid Edema ___ /4] : arytenoid edema [unfilled]U/4 [Arytenoid Erythema ___ /4] : arytenoid erythema [unfilled]U/4 [Normal] : posterior cricoid area had healthy pink mucosa in the interarytenoid area and the esophageal inlet [de-identified] : no papilloma

## 2019-05-22 NOTE — HISTORY OF PRESENT ILLNESS
[FreeTextEntry1] : Patient following up on laryngeal edema. not much improvement with his voice. He continues to not smoke. He stopped taking PPI due to more of an upset stomach.

## 2019-05-24 ENCOUNTER — LABORATORY RESULT (OUTPATIENT)
Age: 74
End: 2019-05-24

## 2019-05-24 ENCOUNTER — APPOINTMENT (OUTPATIENT)
Dept: HEMATOLOGY ONCOLOGY | Facility: CLINIC | Age: 74
End: 2019-05-24

## 2019-05-24 LAB
ANION GAP SERPL CALC-SCNC: 15 MMOL/L
BUN SERPL-MCNC: 11 MG/DL
CALCIUM SERPL-MCNC: 9.5 MG/DL
CHLORIDE SERPL-SCNC: 90 MMOL/L
CO2 SERPL-SCNC: 22 MMOL/L
CREAT SERPL-MCNC: 0.9 MG/DL
GLUCOSE SERPL-MCNC: 92 MG/DL
HCT VFR BLD CALC: 37.3 %
HGB BLD-MCNC: 13.2 G/DL
MCHC RBC-ENTMCNC: 29 PG
MCHC RBC-ENTMCNC: 35.4 G/DL
MCV RBC AUTO: 82 FL
PLATELET # BLD AUTO: 177 K/UL
PMV BLD: 10.1 FL
POTASSIUM SERPL-SCNC: 4.3 MMOL/L
RBC # BLD: 4.55 M/UL
RBC # FLD: 12.9 %
SODIUM SERPL-SCNC: 127 MMOL/L
WBC # FLD AUTO: 3.94 K/UL

## 2019-05-28 ENCOUNTER — OUTPATIENT (OUTPATIENT)
Dept: OUTPATIENT SERVICES | Facility: HOSPITAL | Age: 74
LOS: 1 days | Discharge: HOME | End: 2019-05-28

## 2019-05-28 ENCOUNTER — APPOINTMENT (OUTPATIENT)
Dept: HEMATOLOGY ONCOLOGY | Facility: CLINIC | Age: 74
End: 2019-05-28

## 2019-05-28 DIAGNOSIS — C83.00 SMALL CELL B-CELL LYMPHOMA, UNSPECIFIED SITE: ICD-10-CM

## 2019-05-28 DIAGNOSIS — E89.0 POSTPROCEDURAL HYPOTHYROIDISM: Chronic | ICD-10-CM

## 2019-05-28 DIAGNOSIS — Z98.890 OTHER SPECIFIED POSTPROCEDURAL STATES: Chronic | ICD-10-CM

## 2019-05-28 DIAGNOSIS — Z00.00 ENCOUNTER FOR GENERAL ADULT MEDICAL EXAMINATION W/OUT ABNORMAL FINDINGS: ICD-10-CM

## 2019-05-29 LAB
ANION GAP SERPL CALC-SCNC: 13 MMOL/L
BUN SERPL-MCNC: 18 MG/DL
CALCIUM SERPL-MCNC: 9.8 MG/DL
CHLORIDE SERPL-SCNC: 97 MMOL/L
CO2 SERPL-SCNC: 27 MMOL/L
CREAT SERPL-MCNC: 1.1 MG/DL
GLUCOSE SERPL-MCNC: 94 MG/DL
POTASSIUM SERPL-SCNC: 4.4 MMOL/L
SODIUM SERPL-SCNC: 137 MMOL/L

## 2019-08-26 PROBLEM — R91.8 PULMONARY INFILTRATES: Status: ACTIVE | Noted: 2019-01-01

## 2019-10-02 NOTE — PROGRESS NOTE ADULT - SUBJECTIVE AND OBJECTIVE BOX
Interventional Radiology Outpatient Documentation    PREOPERATIVE DAY OF PROCEDURE EVALUATION:     I have personally seen and examined this patient. I agree with the history and physical which I have reviewed and noted any changes below:     Plan is for Right lung biopsy  (Signed electronically Kelsea Farnsworth MD) 10-02-19 @ 10:43  Procedure/ risks/ benefits/ goals/ alternatives were explained. All questions answered. Informed content obtained from patient. Consent placed in chart.     POSTOPERATIVE PROCEDURAL EVALUATION:     Procedure: Right lung biopsy    Pre-Op Diagnosis: Right lung mass    Post-Op Diagnosis: same    Attending: Daja  Resident: None    Anesthesia (type):  [ ] General Anesthesia  [ x] Sedation  [ ] Spinal Anesthesia  [ x] Local/Regional    Contrast: None    Estimated Blood Loss: Minimal, < 5 cc    Condition:   [ ] Critical  [ ] Serious  [ x] Fair   [ ] Good    Findings/Follow up Plan of Care:    Successful biopsy  6 cores  no immediate complication  cxr 1 hr showed no pneumothorax  patient went home and began feeling SOB and chest pain  he immediately returned  cxr confirmed right pneumothorax  plan for placement of right chest tube  see full report in pacs          Specimens Removed: None    Implants: None    Complications: None immediatey--> delayed pneumothorax     Disposition: Recovery

## 2019-10-02 NOTE — H&P ADULT - NSHPPHYSICALEXAM_GEN_ALL_CORE
GENERAL: NAD, lying in bed comfortably  HEAD: Atraumatic, Normocephalic  EYES: EOMI, PERRLA, conjunctiva pink and cornea white  ENT: Normal external ears and nose, no discharges; moist mucous membranes, no erythema on posterior oropharynx  NECK: Supple, nontender to palpation; no JVD  CHEST/LUNG: Clear to auscultation bilaterally; No rales, rhonchi, wheezing, or rubs. Unlabored respirations  HEART: Regular rate and rhythm; No murmurs, rubs, or gallops  ABDOMEN: Soft, nontender, nondistended; no rebound tenderness, no guarding; no hepatomegaly; normoactive/hyperactive/hypoactive bowel sounds  EXTREMITIES:  2+ Peripheral Pulses, brisk capillary refill. No clubbing, cyanosis, or petal edema  NERVOUS SYSTEM: Alert and oriented to person, time, place and situation, speech clear. No focal deficits   MSK: FROM all 4 extremities, full and equal strength  SKIN: No rashes or lesions GENERAL: NAD, lying in bed comfortably  HEAD: Atraumatic, Normocephalic  EYES: EOMI, PERRLA, conjunctiva pink and cornea white  ENT: Normal external ears and nose, no discharges; moist mucous membranes  NECK: Supple, nontender to palpation; no JVD  CHEST/LUNG: Clear to auscultation bilaterally; Chest tube placed in right anterior lateral chest, clean dressing; clean dressing on right upper back post biopsy site  HEART: Regular rate and rhythm; No murmurs, rubs, or gallops  ABDOMEN: Soft, nontender, nondistended; no rebound tenderness, no guarding; no hepatomegaly; normoactive bowel sounds  EXTREMITIES:  2+ Peripheral Pulses, brisk capillary refill. No clubbing, cyanosis, or petal edema  NERVOUS SYSTEM: Alert and oriented to person, time, place and situation, speech clear. No focal deficits   MSK: FROM all 4 extremities, full and equal strength  SKIN: No rashes or lesions GENERAL: NAD, lying in bed comfortably  HEAD: Atraumatic, Normocephalic  EYES: EOMI, PERRLA, conjunctiva pink and cornea white  ENT: Normal external ears and nose, no discharges; moist mucous membranes  NECK: Supple, nontender to palpation; no JVD  CHEST/LUNG: Clear to auscultation bilaterally; Chest tube placed in right anterior lateral chest, clean dressing; clean dressing on right upper back post biopsy site  HEART/CVS: Regular rate and rhythm; No murmurs, rubs, or gallops  ABDOMEN/GI: Soft, nontender, nondistended; no rebound tenderness, no guarding; no hepatomegaly; normoactive bowel sounds  EXTREMITIES:  2+ Peripheral Pulses, brisk capillary refill. No clubbing, cyanosis, or petal edema  NERVOUS SYSTEM: Alert and oriented to person, time, place and situation, speech clear. No focal deficits   MSK: FROM all 4 extremities, full and equal strength  SKIN: No rashes or lesions

## 2019-10-02 NOTE — H&P ADULT - NSICDXFAMILYHX_GEN_ALL_CORE_FT
FAMILY HISTORY:  Sibling  Still living? Yes, Estimated age: Age Unknown  Family history of breast cancer in sister, Age at diagnosis: Age Unknown FAMILY HISTORY:  Family history of oropharyngeal cancer, Sister  FH: throat cancer, Mother    Sibling  Still living? Yes, Estimated age: Age Unknown  Family history of breast cancer in sister, Age at diagnosis: Age Unknown

## 2019-10-02 NOTE — H&P ADULT - ASSESSMENT
73 years old male from home with PMHx of HTN, hypothyroidism, small B-cell lymphoma (diagnosed in 2015, not on any treatment), right lung mass (discovered 1 month ago) s/p needle biopsy today, presented to [facility] with shortness of breath after procedure. Admitted for right sided tension pneumothorax.      # Right sided tension pneumothorax secondary to needle biopsy  - CT chest showed right sided PTX, pending official read  - S/P chest tube placement by IR, on low continous suction, no leaks on the tubing  - Bilateral equal breath sounds on exam, satting at 97% on 2L NC, can wean off O2 as tolerated  - Will repeat CXR STAT post-chest tube placement  - Pain control with Percocet and Tylenol PRN  - Follow up with IR   - CXR in AM  - Bedrest for now  - No event on tele, can d/c tele in AM if no event overnight    # HTN  - Well-controlled  - Continue losartan 100mg q24rs, HCTZ 25mg q24hrs, and Toprol 100mg q24hrs    # Hypothyroidism  - Continue Synthroid 150mcg      DVT ppx: SubQ heparin  GI ppx: Protonix  Diet: regular diet  Activity: bedrest for now  Lines: Peripheral IVs  Code status: full code  Dispo: acute, pending IR follow

## 2019-10-02 NOTE — H&P ADULT - ATTENDING COMMENTS
Patient seen and examined independently. Resident's H & P reviewed. Agree with the findings and plan of care except,    A 73 years old male recently diagnosed with Rt. Lung mass had lung biopsy done yesterday. Post procedure CXR showed no PTX and he was discharged home. Around 6 PM at home he started experiencing Rt. sided cp and sob. He contacted the IR and they told him to return for evaluation. CT chest was done that showed PTX. He had chest tube placed and admitted to floor.     CT chest.   EKG: NSR @ 73/min. PVC'S. (Interpreted by me.) Patient seen and examined independently. Resident's H & P reviewed. Agree with the findings and plan of care except,    A 73 years old male recently diagnosed with Rt. Lung mass had lung biopsy done yesterday. Post procedure CXR showed no PTX and he was discharged home. Around 6 PM at home he started experiencing Rt. sided cp and sob. He contacted the IR and they told him to return for evaluation. CT chest was done that showed PTX. He had chest tube placed and admitted to floor.     CT chest. Large Rt sided PTX. Check official report  EKG: NSR @ 73/min. PVC'S. (Interpreted by me.)    ASSESSMENT:    1. Post procedure Rt. Sided PTX S/P chest tube  2. Rt. Lung mass  3. Small B-Cell Lymphoma  4. HTN  5. Hypothyroidism    PLAN:    . Care D/W the IR and Pulmonary  . Will remove CT tube today  . Pulmonary recommended to brain MRI to R/O mets  . IR will do adrenal biopsy today. Keep NPO  . Pain control  . Cont his home meds.

## 2019-10-02 NOTE — PROGRESS NOTE ADULT - SUBJECTIVE AND OBJECTIVE BOX
INTERVENTIONAL RADIOLOGY BRIEF-OPERATIVE NOTE    Procedure:  CT guided right chest tube placement    Pre-Op Diagnosis: Right pneumothorax- iatrogenicc    Post-Op Diagnosis: same    Attending: Daja  Resident: None    Anesthesia (type):  [ ] General Anesthesia  [x ] Sedation  [ ] Spinal Anesthesia  [ x] Local/Regional    Contrast: None    Estimated Blood Loss: Minimal, < 5 cc    Condition:   [ ] Critical  [ ] Serious  [ ] Fair   [ c] Good    Findings/Follow up Plan of Care:    8Fr right chest tube   no ptx post evacuation  keep on low continuous suction  CXR in AM        Specimens Removed: none    Implants: none    Complications: none    Disposition: Recovery-->3C      Please call Interventional Radiology [phone] with any questions, concerns, or issues.

## 2019-10-02 NOTE — H&P ADULT - NSICDXPASTMEDICALHX_GEN_ALL_CORE_FT
PAST MEDICAL HISTORY:  HTN (hypertension)     Hypothyroid     Lymphoma Non hodgkins x 2 yrs No chemo or radiation PAST MEDICAL HISTORY:  HTN (hypertension)     Hypothyroid     Lymphoma Small B-cell lymphoma, no treatment

## 2019-10-02 NOTE — H&P ADULT - NSHPREVIEWOFSYSTEMS_GEN_ALL_CORE
CONSTITUTIONAL: No unintentional weight loss; no weakness; no fevers or chills  RESPIRATORY: No cough, wheezing, hemoptysis; no shortness of breath/shortness of breath on exertion; no orthopnea  CARDIOVASCULAR: No chest pain or palpitations  GASTROINTESTINAL: No abdominal or epigastric pain; no change in appetite; no early satiety; no nausea, vomiting, or hematemesis; no diarrhea or constipation; no melena or hematochezia; no change of stool caliber  GENITOURINARY: No dysuria, increased in urinary frequency or hematuria; no urethral discharge  NEUROLOGICAL: No headache/dizziness; no focal numbness or motor weakness  SKIN: No itching, rashes; no recent insect bites CONSTITUTIONAL: No unintentional weight loss; no weakness; no fevers or chills  RESPIRATORY: Reports shortness of breath in the afternoon, now resolved. No cough, wheezing, hemoptysis; no orthopnea  CARDIOVASCULAR: Reports mild right sided chest pain  GASTROINTESTINAL: No abdominal or epigastric pain; no change in appetite; no early satiety; no nausea, vomiting, or hematemesis; no diarrhea or constipation; no melena or hematochezia; no change of stool caliber  GENITOURINARY: No dysuria, increased in urinary frequency or hematuria; no urethral discharge  NEUROLOGICAL: No headache/dizziness; no focal numbness or motor weakness  SKIN: No itching, rashes; no recent insect bites

## 2019-10-02 NOTE — H&P ADULT - HISTORY OF PRESENT ILLNESS
73 years old male from home with PMHx of HTN, hypothyroidism, right lung mass, s/p IR biopsy, presented to [facility] with shortness of breath after procedure.       T(C): 36.4 (02 Oct 2019 19:32), Max: 36.4 (02 Oct 2019 19:32)  T(F): 97.6 (02 Oct 2019 19:32), Max: 97.6 (02 Oct 2019 19:32)  HR: 72 (02 Oct 2019 19:32) (72 - 72)  BP: 112/- (02 Oct 2019 19:32) (112/- - 112/-)  RR: 18 (02 Oct 2019 19:32) (18 - 18) 73 years old male from home with PMHx of HTN, hypothyroidism, small B-cell lymphoma (diagnosed in 2015, not on any treatment), right lung mass (discovered 1 month ago) s/p needle biopsy today, presented to [facility] with shortness of breath after procedure. Patient completed the procedure early in the afternoon and the post-procedure xray showed no PTX. Patient got home at 2:30PM, asymptomatic, but at around 6PM, he started to experienced right sided anterior chest pain and shortness of breath. He contacted the IR attending and was told to return for evaluation. CT scan shows right sided pneumothorax. Chest tube was placed by IR STAT and patient is transferred to inpatient floor for monitoring. Patient still reports mild right sided anterior chest pain, but shortness of breath has resolved.       T(C): 36.4 (02 Oct 2019 19:32), Max: 36.4 (02 Oct 2019 19:32)  T(F): 97.6 (02 Oct 2019 19:32), Max: 97.6 (02 Oct 2019 19:32)  HR: 72 (02 Oct 2019 19:32) (72 - 72)  BP: 112/- (02 Oct 2019 19:32) (112/- - 112/-)  RR: 18 (02 Oct 2019 19:32) (18 - 18)

## 2019-10-02 NOTE — H&P ADULT - NSHPSOCIALHISTORY_GEN_ALL_CORE
Former smoker of 58 years, 1.5PPD, quit 11 months ago  Social drinker  Denies illicit drug use      Lives at home with sister, functionally independent

## 2019-10-03 NOTE — DISCHARGE NOTE PROVIDER - PROVIDER TOKENS
PROVIDER:[TOKEN:[[id number]],FOLLOWUP:[2 weeks]],PROVIDER:[TOKEN:[[id number]],FOLLOWUP:[2 weeks]]

## 2019-10-03 NOTE — CONSULT NOTE ADULT - SUBJECTIVE AND OBJECTIVE BOX
Patient is a 73y old  Male who presents with a chief complaint of shortness of breath (03 Oct 2019 09:15)      HPI:  73 years old male from home with PMHx of HTN, hypothyroidism, small B-cell lymphoma (diagnosed in 2015, not on any treatment), right lung mass (discovered 1 month ago) s/p needle biopsy today, presented to [facility] with shortness of breath after procedure. Patient completed the procedure early in the afternoon and the post-procedure xray showed no PTX. Patient got home at 2:30PM, asymptomatic, but at around 6PM, he started to experienced right sided anterior chest pain and shortness of breath. He contacted the IR attending and was told to return for evaluation. CT scan shows right sided pneumothorax. Chest tube was placed by IR STAT and patient is transferred to inpatient floor for monitoring. Patient still reports mild right sided anterior chest pain, but shortness of breath has resolved.       T(C): 36.4 (02 Oct 2019 19:32), Max: 36.4 (02 Oct 2019 19:32)  T(F): 97.6 (02 Oct 2019 19:32), Max: 97.6 (02 Oct 2019 19:32)  HR: 72 (02 Oct 2019 19:32) (72 - 72)  BP: 112/- (02 Oct 2019 19:32) (112/- - 112/-)  RR: 18 (02 Oct 2019 19:32) (18 - 18) (02 Oct 2019 20:08)         PAST MEDICAL & SURGICAL HISTORY:  Hypothyroid  HTN (hypertension)  Lymphoma: Small B-cell lymphoma, no treatment  S/P lymph node biopsy  H/O thyroidectomy      SOCIAL HISTORY:    FAMILY HISTORY:  Family history of oropharyngeal cancer: Sister  FH: throat cancer: Mother  Family history of breast cancer in sister (Sibling)    Allergies    erythromycin (Rash)  Sulfa (Rash)    Intolerances    IV Contrast (Flushing; Rash)    ROS:  Negative except for:        Height (cm): 170.2 (10-02-19 @ 19:32)  Weight (kg): 74.8 (10-02-19 @ 19:32)  BMI (kg/m2): 25.8 (10-02-19 @ 19:32)  BSA (m2): 1.86 (10-02-19 @ 19:32)      HOME MEDICATIONS:  losartan-hydroCHLOROthiazide 100 mg-25 mg oral tablet: 1 tab(s) orally once a day (02 Oct 2019 20:41)  metoprolol succinate 100 mg oral tablet, extended release: 1 tab(s) orally once a day (02 Oct 2019 20:41)  raNITIdine 300 mg oral capsule: 1 cap(s) orally once a day (at bedtime) (02 Oct 2019 20:41)  Synthroid 150 mcg (0.15 mg) oral tablet: 1 tab(s) orally once a day (02 Oct 2019 20:41)      Vital Signs Last 24 Hrs  T(C): 35.6 (03 Oct 2019 05:15), Max: 36.4 (02 Oct 2019 19:32)  T(F): 96.1 (03 Oct 2019 05:15), Max: 97.6 (02 Oct 2019 19:32)  HR: 67 (03 Oct 2019 05:15) (67 - 72)  BP: 133/67 (03 Oct 2019 05:15) (112/- - 133/67)  BP(mean): --  RR: 18 (03 Oct 2019 05:15) (18 - 18)  SpO2: --    PHYSICAL EXAM  General: adult in NAD  HEENT: clear oropharynx, anicteric sclera, pink conjunctiva  Neck: supple  CV: normal S1/S2 with no murmur rubs or gallops  Lungs: positive air movement b/l ant lungs, Rt sided chest tube.  Abdomen: soft non-tender non-distended, no hepatosplenomegaly  Ext: no clubbing cyanosis or edema  Skin: no rashes and no petechiae  Neuro: alert and oriented X 4, no focal deficits    MEDICATIONS  (STANDING):  chlorhexidine 4% Liquid 1 Application(s) Topical <User Schedule>  docusate sodium 100 milliGRAM(s) Oral three times a day  heparin  Injectable 5000 Unit(s) SubCutaneous every 8 hours  hydrochlorothiazide 25 milliGRAM(s) Oral daily  influenza   Vaccine 0.5 milliLiter(s) IntraMuscular once  levothyroxine 150 MICROGram(s) Oral daily  losartan 100 milliGRAM(s) Oral daily  metoprolol succinate  milliGRAM(s) Oral daily  pantoprazole    Tablet 40 milliGRAM(s) Oral before breakfast  sodium chloride 0.9%. 1000 milliLiter(s) (75 mL/Hr) IV Continuous <Continuous>    MEDICATIONS  (PRN):  acetaminophen   Tablet .. 650 milliGRAM(s) Oral every 6 hours PRN Mild Pain (1 - 3)  oxyCODONE    5 mG/acetaminophen 325 mG 1 Tablet(s) Oral every 6 hours PRN Severe Pain (7 - 10)      LABS:                          11.8   3.52  )-----------( 244      ( 03 Oct 2019 05:35 )             35.0         Mean Cell Volume : 85.2 fL  Mean Cell Hemoglobin : 28.7 pg  Mean Cell Hemoglobin Concentration : 33.7 g/dL  Auto Neutrophil # : 2.36 K/uL  Auto Lymphocyte # : 0.55 K/uL  Auto Monocyte # : 0.44 K/uL  Auto Eosinophil # : 0.11 K/uL  Auto Basophil # : 0.04 K/uL  Auto Neutrophil % : 67.1 %  Auto Lymphocyte % : 15.6 %  Auto Monocyte % : 12.5 %  Auto Eosinophil % : 3.1 %  Auto Basophil % : 1.1 %      Serial CBC's  10-03 @ 05:35  Hct-35.0 / Hgb-11.8 / Plat-244 / RBC-4.11 / WBC-3.52  Serial CBC's  10-03 @ 00:24  Hct-33.0 / Hgb-11.2 / Plat-236 / RBC-3.89 / WBC-4.53      10-03    133<L>  |  93<L>  |  15  ----------------------------<  87  3.8   |  28  |  0.9    Ca    9.5      03 Oct 2019 05:35  Mg     1.8     10-03    TPro  6.0  /  Alb  4.0  /  TBili  0.4  /  DBili  x   /  AST  13  /  ALT  10  /  AlkPhos  74  10-03      PT/INR - ( 03 Oct 2019 00:24 )   PT: 12.00 sec;   INR: 1.04 ratio         PTT - ( 03 Oct 2019 00:24 )  PTT:36.4 sec                            BLOOD SMEAR INTERPRETATION:       RADIOLOGY & ADDITIONAL STUDIES:  < from: NM PET/CT Onc FDG Skull to Thigh, Inital (09.13.19 @ 13:08) >  COMPARISON : No prior PET/CTs. Correlation with CT chest performed   8/29/2019.    FINDINGS:    Head/Neck: Focus of abnormal FDG uptake within the right true vocal cord,   SUV 11.9.    Thorax:   Interval enlargement of a right FDG avid mass/opacity within   the superior right lower lobe, now measuring 5.3 x 3.1 (previously 4.1 x   2), SUV 19.4. Additional right upper lobe 5 mm solid nodule is FDG avid,   SUV 1.6 and positive on the non-attenuated corrected images (axial image   212).    There is an stable right hilar lymphadenopathy measuring up to 3 x 1.3   cm, SUV 5.2 (image 213), and left suprahilar adenopathy measuring up to   3.5 x 1.2 cm, SUV 18.1 (image 220). An additional 1.2 x 0.8 cm right   paratracheal lymph node is FDG avid, SUV 3.8.     Abdomen/Pelvis: Physiologic GI/  activity.  Mildly prominent   para-aortic adenopathy is not FDG avid.    Musculoskeletal :  No suspicious hypermetabolic osseous lesions.    Additional CT findings:  Emphysematous changes.  Prostatomegaly measuring up to 5 cm with coarse parenchymal   calcifications.  Colonic diverticulosis.  Right gluteal fatty atrophy.  Atherosclerotic disease.    IMPRESSION:     Multiple areas of pathologic FDG uptake consistent with bilateral tumor   activity, including:    Interval enlargement of a superior right lower lobe mass, now measuring   5.3 x 3.1, SUV 19.4.    5 mm right upper lobe solid nodule, SUV 1.6, FDG positive on   non-attenuated corrected images (axial image 212).    Mediastinal adenopathy as above, maximum SUV 18.1 in the left suprahilar   region.    Right true vocal cord, SUV 11.9, which may be secondary to the patient's   known squamous papilloma. Direct visualization is recommended if not   recently performed.          < end of copied text > Patient is a 73y old  Male who presents with a chief complaint of shortness of breath (03 Oct 2019 09:15)      HPI:  73 years old male from home with PMHx of HTN, hypothyroidism, small B-cell lymphoma (diagnosed in 2015, not on any treatment), right lung mass (discovered 1 month ago) s/p needle biopsy today, presented to [facility] with shortness of breath after procedure. Patient completed the procedure early in the afternoon and the post-procedure xray showed no PTX. Patient got home at 2:30PM, asymptomatic, but at around 6PM, he started to experienced right sided anterior chest pain and shortness of breath. He contacted the IR attending and was told to return for evaluation. CT scan shows right sided pneumothorax. Chest tube was placed by IR STAT and patient is transferred to inpatient floor for monitoring. Patient still reports mild right sided anterior chest pain, but shortness of breath has resolved.   Oncology consultation was called given the findings on the chest CT and his small lymphocytic lymphoma.      T(C): 36.4 (02 Oct 2019 19:32), Max: 36.4 (02 Oct 2019 19:32)  T(F): 97.6 (02 Oct 2019 19:32), Max: 97.6 (02 Oct 2019 19:32)  HR: 72 (02 Oct 2019 19:32) (72 - 72)  BP: 112/- (02 Oct 2019 19:32) (112/- - 112/-)  RR: 18 (02 Oct 2019 19:32) (18 - 18) (02 Oct 2019 20:08)         PAST MEDICAL & SURGICAL HISTORY:  Hypothyroid  HTN (hypertension)  Lymphoma: Small B-cell lymphoma, no treatment  S/P lymph node biopsy  H/O thyroidectomy      SOCIAL HISTORY:    FAMILY HISTORY:  Family history of oropharyngeal cancer: Sister  FH: throat cancer: Mother  Family history of breast cancer in sister (Sibling)    Allergies    erythromycin (Rash)  Sulfa (Rash)    Intolerances    IV Contrast (Flushing; Rash)    ROS:  Negative except for:        Height (cm): 170.2 (10-02-19 @ 19:32)  Weight (kg): 74.8 (10-02-19 @ 19:32)  BMI (kg/m2): 25.8 (10-02-19 @ 19:32)  BSA (m2): 1.86 (10-02-19 @ 19:32)      HOME MEDICATIONS:  Losartan-hydroCHLOROthiazide 100 mg-25 mg oral tablet: 1 tab(s) orally once a day (02 Oct 2019 20:41)  Metoprolol succinate 100 mg oral tablet, extended release: 1 tab(s) orally once a day (02 Oct 2019 20:41)  RaNITIdine 300 mg oral capsule: 1 cap(s) orally once a day (at bedtime) (02 Oct 2019 20:41)  Synthroid 150 mcg (0.15 mg) oral tablet: 1 tab(s) orally once a day (02 Oct 2019 20:41)      Vital Signs Last 24 Hrs  T(C): 35.6 (03 Oct 2019 05:15), Max: 36.4 (02 Oct 2019 19:32)  T(F): 96.1 (03 Oct 2019 05:15), Max: 97.6 (02 Oct 2019 19:32)  HR: 67 (03 Oct 2019 05:15) (67 - 72)  BP: 133/67 (03 Oct 2019 05:15) (112/- - 133/67)  BP(mean): --  RR: 18 (03 Oct 2019 05:15) (18 - 18)  SpO2: --    PHYSICAL EXAM  General: adult in NAD  HEENT: clear oropharynx, anicteric sclera, pink conjunctiva  Neck: supple  CV: normal S1/S2 with no murmur rubs or gallops  Lungs: positive air movement b/l ant lungs, Rt sided chest tube.  Abdomen: soft non-tender non-distended, no hepatosplenomegaly  Ext: no clubbing cyanosis or edema  Skin: no rashes and no petechiae  Neuro: alert and oriented X 4, no focal deficits    MEDICATIONS  (STANDING):  chlorhexidine 4% Liquid 1 Application(s) Topical <User Schedule>  docusate sodium 100 milliGRAM(s) Oral three times a day  heparin  Injectable 5000 Unit(s) SubCutaneous every 8 hours  hydrochlorothiazide 25 milliGRAM(s) Oral daily  influenza   Vaccine 0.5 milliLiter(s) IntraMuscular once  levothyroxine 150 MICROGram(s) Oral daily  losartan 100 milliGRAM(s) Oral daily  metoprolol succinate  milliGRAM(s) Oral daily  pantoprazole    Tablet 40 milliGRAM(s) Oral before breakfast  sodium chloride 0.9%. 1000 milliLiter(s) (75 mL/Hr) IV Continuous <Continuous>    MEDICATIONS  (PRN):  acetaminophen   Tablet .. 650 milliGRAM(s) Oral every 6 hours PRN Mild Pain (1 - 3)  oxyCODONE    5 mG/acetaminophen 325 mG 1 Tablet(s) Oral every 6 hours PRN Severe Pain (7 - 10)      LABS:                          11.8   3.52  )-----------( 244      ( 03 Oct 2019 05:35 )             35.0         Mean Cell Volume : 85.2 fL  Mean Cell Hemoglobin : 28.7 pg  Mean Cell Hemoglobin Concentration : 33.7 g/dL  Auto Neutrophil # : 2.36 K/uL  Auto Lymphocyte # : 0.55 K/uL  Auto Monocyte # : 0.44 K/uL  Auto Eosinophil # : 0.11 K/uL  Auto Basophil # : 0.04 K/uL  Auto Neutrophil % : 67.1 %  Auto Lymphocyte % : 15.6 %  Auto Monocyte % : 12.5 %  Auto Eosinophil % : 3.1 %  Auto Basophil % : 1.1 %      Serial CBC's  10-03 @ 05:35  Hct-35.0 / Hgb-11.8 / Plat-244 / RBC-4.11 / WBC-3.52  Serial CBC's  10-03 @ 00:24  Hct-33.0 / Hgb-11.2 / Plat-236 / RBC-3.89 / WBC-4.53      10-03    133<L>  |  93<L>  |  15  ----------------------------<  87  3.8   |  28  |  0.9    Ca    9.5      03 Oct 2019 05:35  Mg     1.8     10-03    TPro  6.0  /  Alb  4.0  /  TBili  0.4  /  DBili  x   /  AST  13  /  ALT  10  /  AlkPhos  74  10-03      PT/INR - ( 03 Oct 2019 00:24 )   PT: 12.00 sec;   INR: 1.04 ratio         PTT - ( 03 Oct 2019 00:24 )  PTT:36.4 sec                            BLOOD SMEAR INTERPRETATION:       RADIOLOGY & ADDITIONAL STUDIES:  < from: NM PET/CT Onc FDG Skull to Thigh, Inital (09.13.19 @ 13:08) >  COMPARISON : No prior PET/CTs. Correlation with CT chest performed   8/29/2019.    FINDINGS:    Head/Neck: Focus of abnormal FDG uptake within the right true vocal cord,   SUV 11.9.    Thorax:   Interval enlargement of a right FDG avid mass/opacity within   the superior right lower lobe, now measuring 5.3 x 3.1 (previously 4.1 x   2), SUV 19.4. Additional right upper lobe 5 mm solid nodule is FDG avid,   SUV 1.6 and positive on the non-attenuated corrected images (axial image   212).    There is an stable right hilar lymphadenopathy measuring up to 3 x 1.3   cm, SUV 5.2 (image 213), and left suprahilar adenopathy measuring up to   3.5 x 1.2 cm, SUV 18.1 (image 220). An additional 1.2 x 0.8 cm right   paratracheal lymph node is FDG avid, SUV 3.8.     Abdomen/Pelvis: Physiologic GI/  activity.  Mildly prominent   para-aortic adenopathy is not FDG avid.    Musculoskeletal :  No suspicious hypermetabolic osseous lesions.    Additional CT findings:  Emphysematous changes.  Prostatomegaly measuring up to 5 cm with coarse parenchymal   calcifications.  Colonic diverticulosis.  Right gluteal fatty atrophy.  Atherosclerotic disease.    IMPRESSION:     Multiple areas of pathologic FDG uptake consistent with bilateral tumor   activity, including:    Interval enlargement of a superior right lower lobe mass, now measuring   5.3 x 3.1, SUV 19.4.    5 mm right upper lobe solid nodule, SUV 1.6, FDG positive on   non-attenuated corrected images (axial image 212).    Mediastinal adenopathy as above, maximum SUV 18.1 in the left suprahilar   region.    Right true vocal cord, SUV 11.9, which may be secondary to the patient's   known squamous papilloma. Direct visualization is recommended if not   recently performed.          < end of copied text >

## 2019-10-03 NOTE — PROGRESS NOTE ADULT - SUBJECTIVE AND OBJECTIVE BOX
OVERNIGHT EVENTS: events noted, s/p iatrogenic ptx, no air leak    Vital Signs Last 24 Hrs  T(C): 36.2 (03 Oct 2019 13:43), Max: 36.4 (02 Oct 2019 19:32)  T(F): 97.2 (03 Oct 2019 13:43), Max: 97.6 (02 Oct 2019 19:32)  HR: 68 (03 Oct 2019 13:43) (67 - 72)  BP: 129/56 (03 Oct 2019 13:43) (112/- - 133/67)  RR: 18 (03 Oct 2019 13:43) (18 - 18)  SpO2: 94%    PHYSICAL EXAMINATION:    GENERAL: The patient is awake and alert in no apparent distress.     HEENT: Head is normocephalic and atraumatic. Extraocular muscles are intact. Mucous membranes are moist.    NECK: Supple.    LUNGS: good air entry    HEART: Regular rate and rhythm without murmur.    ABDOMEN: Soft, nontender, and nondistended.      EXTREMITIES: Without any cyanosis, clubbing, rash, lesions or edema.    NEUROLOGIC: Grossly intact.    SKIN: No ulceration or induration present.      LABS:                        11.8   3.52  )-----------( 244      ( 03 Oct 2019 05:35 )             35.0     10-03    133<L>  |  93<L>  |  15  ----------------------------<  87  3.8   |  28  |  0.9    Ca    9.5      03 Oct 2019 05:35  Mg     1.8     10-03    TPro  6.0  /  Alb  4.0  /  TBili  0.4  /  DBili  x   /  AST  13  /  ALT  10  /  AlkPhos  74  10-03    PT/INR - ( 03 Oct 2019 00:24 )   PT: 12.00 sec;   INR: 1.04 ratio         PTT - ( 03 Oct 2019 00:24 )  PTT:36.4 sec                      10-02-19 @ 07:01  -  10-03-19 @ 07:00  --------------------------------------------------------  IN: 240 mL / OUT: 200 mL / NET: 40 mL    10-03-19 @ 07:01  -  10-03-19 @ 17:22  --------------------------------------------------------  IN: 220 mL / OUT: 300 mL / NET: -80 mL        MICROBIOLOGY:      MEDICATIONS  (STANDING):  chlorhexidine 4% Liquid 1 Application(s) Topical <User Schedule>  docusate sodium 100 milliGRAM(s) Oral three times a day  heparin  Injectable 5000 Unit(s) SubCutaneous every 8 hours  hydrochlorothiazide 25 milliGRAM(s) Oral daily  influenza   Vaccine 0.5 milliLiter(s) IntraMuscular once  levothyroxine 150 MICROGram(s) Oral daily  losartan 100 milliGRAM(s) Oral daily  metoprolol succinate  milliGRAM(s) Oral daily  pantoprazole    Tablet 40 milliGRAM(s) Oral before breakfast  sodium chloride 0.9%. 1000 milliLiter(s) (75 mL/Hr) IV Continuous <Continuous>    MEDICATIONS  (PRN):  acetaminophen   Tablet .. 650 milliGRAM(s) Oral every 6 hours PRN Mild Pain (1 - 3)  oxyCODONE    5 mG/acetaminophen 325 mG 1 Tablet(s) Oral every 6 hours PRN Severe Pain (7 - 10)      RADIOLOGY & ADDITIONAL STUDIES:

## 2019-10-03 NOTE — PROGRESS NOTE ADULT - ASSESSMENT
LUNG MASS S/P BIOPSY / IATROGENIC PTX    - PIGTAIL WATER SEAL THAN DX IF CXR GOOD  - MRI BRAIN  - HEMOC EVAL  - WILL FOLLOW

## 2019-10-03 NOTE — DISCHARGE NOTE PROVIDER - HOSPITAL COURSE
73 years old male from home with PMHx of HTN, hypothyroidism, small B-cell lymphoma (diagnosed in 2015, not on any treatment), right lung mass (discovered 1 month ago) s/p needle biopsy today, presented to [facility] with shortness of breath after procedure. Patient completed the procedure early in the afternoon and the post-procedure xray showed no PTX. Patient got home at 2:30PM, asymptomatic, but at around 6PM, he started to experienced right sided anterior chest pain and shortness of breath. He contacted the IR attending and was told to return for evaluation. CT scan shows right sided pneumothorax. Chest tube was placed by IR STAT and patient is transferred to inpatient floor for monitoring. Patient's chest xray now shows resolution of pneumothorax; clinically, shortness of breath has resolved. Chest tube was pulled and the patient is clear for discharge. He received MRI brain as part of cancer staging work up. 73 years old male from home with PMHx of HTN, hypothyroidism, small B-cell lymphoma (diagnosed in 2015, not on any treatment), right lung mass (discovered 1 month ago) s/p needle biopsy today, presented to [facility] with shortness of breath after procedure. Patient completed the procedure early in the afternoon and the post-procedure xray showed no PTX. Patient got home at 2:30PM, asymptomatic, but at around 6PM, he started to experienced right sided anterior chest pain and shortness of breath. He contacted the IR attending and was told to return for evaluation. CT scan shows right sided pneumothorax. Chest tube was placed by IR STAT and patient is transferred to inpatient floor for monitoring. Patient's chest xray now shows resolution of pneumothorax; clinically, shortness of breath has resolved. Chest tube was pulled and the patient is clear for discharge. He received MRI brain as part of cancer staging work up. He is stable and has been cleared by IR and pulmonology for discharge. He will follow up outpatient with Dr. Merrill for biopsy of mediastinal lymph nodes.

## 2019-10-03 NOTE — DISCHARGE NOTE PROVIDER - NSDCCPCAREPLAN_GEN_ALL_CORE_FT
PRINCIPAL DISCHARGE DIAGNOSIS  Diagnosis: Iatrogenic pneumothorax  Assessment and Plan of Treatment: The procedure you had to biopsy the tissue of your lung caused air to become trapped in your chest. The air has been drained, and it has been confirmed that there is no reaccumulation. Please return to the hospital if you experience shortness of breath or chest pain.      SECONDARY DISCHARGE DIAGNOSES  Diagnosis: Lung mass  Assessment and Plan of Treatment: The MRI you had as part of your work up was read as normal. You will complete the rest of your work up on an out patient basis. Please follow up with Dr. Carney regarding the results of your lung tissue biopsy. Dr. Merrill's office will call on Monday to make an appointment for your thoracic lymph node biopsy.    Diagnosis: Hypothyroidism  Assessment and Plan of Treatment: Please continue to take your synthroid as prescribed. Follow up with your PCP/Endocrinologist for routine management.    Diagnosis: Hypertension  Assessment and Plan of Treatment: Hypertension, commonly called high blood pressure, is when the force of blood pumping through your arteries is too strong. Hypertension forces your heart to work harder to pump blood. Your arteries may become narrow or stiff. Having untreated or uncontrolled hypertension for a long period of time can cause heart attack, stroke, kidney disease, and other problems. If started on a medication, take exactly as prescribed by your health care professional. Maintain a healthy lifestyle and follow up with your primary care physician.  SEEK IMMEDIATE MEDICAL CARE IF YOU HAVE ANY OF THE FOLLOWING SYMPTOMS: severe headache, confusion, chest pain, abdominal pain, vomiting, or shortness of breath.

## 2019-10-03 NOTE — PROGRESS NOTE ADULT - SUBJECTIVE AND OBJECTIVE BOX
Interventional Radiology Follow- Up Note    73y Male s/p Right lung biopsy and chest tube placement on 10/2 in Interventional Radiology with Dr Farnsworth    Vitals: T(F): 97.2 (10-03-19 @ 13:43), Max: 97.6 (10-02-19 @ 19:32)  HR: 68 (10-03-19 @ 13:43) (67 - 72)  BP: 129/56 (10-03-19 @ 13:43) (112/- - 133/67)  RR: 18 (10-03-19 @ 13:43) (18 - 18)  SpO2: --  Wt(kg): --    LABS:                        11.8   3.52  )-----------( 244      ( 03 Oct 2019 05:35 )             35.0     10-03    133<L>  |  93<L>  |  15  ----------------------------<  87  3.8   |  28  |  0.9    Ca    9.5      03 Oct 2019 05:35  Mg     1.8     10-03    TPro  6.0  /  Alb  4.0  /  TBili  0.4  /  DBili  x   /  AST  13  /  ALT  10  /  AlkPhos  74  10-03    PT/INR - ( 03 Oct 2019 00:24 )   PT: 12.00 sec;   INR: 1.04 ratio         PTT - ( 03 Oct 2019 00:24 )  PTT:36.4 sec    PHYSICAL EXAM:  General: Nontoxic, in NAD  Neuro:  Alert & oriented x 3    Impression:     73 years old male from home with PMHx of HTN, hypothyroidism, small B-cell lymphoma (diagnosed in 2015, not on any treatment), right lung mass (discovered 1 month ago) s/p needle biopsy on 10/2, presented to [facility] with shortness of breath after procedure. On repeat CXR, patient was found to have a right-sided pneumothorax after which a chest tube was placed on 10/2.  - Repeat CXR demonstrated no pneumothorax - chest tube was pulled with no acute events  - From IR standpoint, patient is stable and ready to be discharged today.  - Discussed with the clinical team at [phone].     Please call Interventional Radiology [phone] with any questions, concerns, or issues regarding above.

## 2019-10-03 NOTE — PROGRESS NOTE ADULT - ASSESSMENT
**this is a preliminary note and will be edited**    73 years old male from home with PMHx of HTN, hypothyroidism, small B-cell lymphoma (diagnosed in 2015, not on any treatment), right lung mass (discovered 1 month ago) s/p needle biopsy today, presented to [facility] with shortness of breath after procedure. Admitted for right sided tension pneumothorax.    # Right sided tension pneumothorax secondary to needle biopsy  - CT chest showed right sided PTX, pending official read  - S/P chest tube placement by IR, on low continous suction, no leaks on the tubing  - Bilateral equal breath sounds on exam, satting at 97% on 2L NC, can wean off O2 as tolerated  - Will repeat CXR STAT post-chest tube placement  - Pain control with Percocet and Tylenol PRN  - Follow up with IR   - CXR in AM  - Bedrest for now  - No event on tele, can d/c tele in AM if no event overnight    # HTN  - Well-controlled  - Continue losartan 100mg q24rs, HCTZ 25mg q24hrs, and Toprol 100mg q24hrs    # Hypothyroidism  - Continue Synthroid 150mcg      DVT ppx: SubQ heparin  GI ppx: Protonix  Diet: regular diet  Activity: bedrest for now  Lines: Peripheral IVs  Code status: full code  Dispo: acute, pending IR follow 73 years old male from home with PMHx of HTN, hypothyroidism, small B-cell lymphoma (diagnosed in 2015, not on any treatment), right lung mass (discovered 1 month ago) s/p needle biopsy today, presented to [facility] with shortness of breath after procedure. Admitted for right sided tension pneumothorax.    # Right sided tension pneumothorax secondary to needle biopsy  - CT chest showed right sided PTX  - S/P chest tube placement by IR, changed to water seal today  - Bilateral equal breath sounds on exam, satting at 97% on 2L NC, can wean off O2 as tolerated  - Pain control with Percocet and Tylenol PRN  - AM CXR improved  - No event on tele  - Follow up IR    # Hx of lymphoma, now with new lung mass, s/p biopsy  - heme once following  - MRI brain today as part of staging workup    # HTN  - Well-controlled  - Continue losartan 100mg q24rs, HCTZ 25mg q24hrs, and Toprol 100mg q24hrs    # Hypothyroidism  - Continue Synthroid 150mcg      DVT ppx: SubQ heparin  GI ppx: Protonix  Diet: regular diet  Activity: bedrest for now  Lines: Peripheral IVs  Code status: full code  Dispo: from home

## 2019-10-03 NOTE — CONSULT NOTE ADULT - ASSESSMENT
73 years old male from home with PMHx of HTN, hypothyroidism, small B-cell lymphoma (diagnosed in 2015, not on any treatment), right lung mass (discovered 1 month ago) s/p needle biopsy today, presented to [facility] with shortness of breath after procedure.     Pt was diagnosed with Small lymphocytic lymphoma in 2015 , and was clinically stable since then . Last routine F/U visit  was in May , 2019 . At that time pt had no symptoms so imaging was deferred . Pt was later seen by GI for GERD and routine chest xray showed opacity , he was referred to pulm and from there had CT chest that showed lung mass and lymphadenopathy .  Pt also had PET scan done. S/p biopsy of lung mass by IR , developed pneumothorax after procedure . Has chest tube .       # H/O Small Lymphocytic lymphoma / New lung mass / Right lower lobe mass with mediastinal lymphadenopathy    -- Could be Mak's  transformation VRS new Primary malignancy   - will f/u with biopsy results.  - Pt asymptomatic , mild normocytic anemia .   -  LDH was normal in May , repeat LDH now .   - send flow cytometry.  - Will need a bone marrow if biopsy shows transformation.  - MRI of brain to r/o intracranial mets.   - repeat xray today .   - Will f/u with pt . 73 years old male from home with PMHx of HTN, hypothyroidism, small B-cell lymphoma (diagnosed in 2015, not on any treatment), right lung mass (discovered 1 month ago) s/p needle biopsy today, presented to [facility] with shortness of breath after procedure.     Pt was diagnosed with Small lymphocytic lymphoma in 2015 , and was clinically stable since then . Last routine F/U visit  was in May , 2019 . At that time pt had no symptoms so imaging was deferred . Pt was later seen by GI for GERD and routine chest xray showed opacity , he was referred to pulm and from there had CT chest that showed lung mass and lymphadenopathy .  Pt also had PET scan done. S/p biopsy of lung mass by IR , developed pneumothorax after procedure . Has chest tube .       # H/O Small Lymphocytic lymphoma / New lung mass / Right lower lobe mass with mediastinal lymphadenopathy    - Could be Mak's  transformation vrs new Primary lung malignancy   - Will f/u with biopsy results.  - Pt asymptomatic , mild normocytic anemia .   - LDH was normal in May , repeat LDH now .   - Send flow cytometry.  - Will need a bone marrow if biopsy shows transformation.  - MRI of brain to r/o intracranial mets.   - repeat xray today .   - Will f/u with pt .

## 2019-10-03 NOTE — CHART NOTE - NSCHARTNOTEFT_GEN_A_CORE
General Thoracic Surgery Attestation    I have seen and examined the patient.  Where appropriate I have updated, edited, or corrected the resident's or PA's note with regard to findings, values, and plan.    aware of patient from tumor board and previous discussions with Dr. Mims.  Met patient this a.m.  Biopsy results pending.  Complicated staging picture.  Will plan for clinic followup next wednesday (will coordinate through my office) and further planning from there.  No thoracic intervention this admission and ok to discharge once pneumothorax/air leak resolved and chest tube out.

## 2019-10-03 NOTE — PROGRESS NOTE ADULT - SUBJECTIVE AND OBJECTIVE BOX
**this is a preliminary note and will be edited**    Hospital Day:  1d    Subjective:    Patient is a 73y old  Male who presents with a chief complaint of shortness of breath (02 Oct 2019 20:08)      There were no acute overnight events. The patient was seen and examined at the bedside. No complaints. Denies fever, chills, headache, dizziness, chest pain, palpitations, shortness of breath, abdominal pain, nausea, vomiting, diarrhea.    Past Medical Hx:   Hypothyroid  HTN (hypertension)  Lymphoma    Past Sx:  S/P lymph node biopsy  H/O thyroidectomy    Allergies:  erythromycin (Rash)  IV Contrast (Flushing; Rash)  Sulfa (Rash)    Current Meds:   Stand Meds:  chlorhexidine 4% Liquid 1 Application(s) Topical <User Schedule>  docusate sodium 100 milliGRAM(s) Oral three times a day  heparin  Injectable 5000 Unit(s) SubCutaneous every 8 hours  hydrochlorothiazide 25 milliGRAM(s) Oral daily  influenza   Vaccine 0.5 milliLiter(s) IntraMuscular once  levothyroxine 150 MICROGram(s) Oral daily  losartan 100 milliGRAM(s) Oral daily  metoprolol succinate  milliGRAM(s) Oral daily  pantoprazole    Tablet 40 milliGRAM(s) Oral before breakfast    PRN Meds:  acetaminophen   Tablet .. 650 milliGRAM(s) Oral every 6 hours PRN Mild Pain (1 - 3)  oxyCODONE    5 mG/acetaminophen 325 mG 1 Tablet(s) Oral every 6 hours PRN Severe Pain (7 - 10)    HOME MEDICATIONS:  losartan-hydroCHLOROthiazide 100 mg-25 mg oral tablet: 1 tab(s) orally once a day  metoprolol succinate 100 mg oral tablet, extended release: 1 tab(s) orally once a day  raNITIdine 300 mg oral capsule: 1 cap(s) orally once a day (at bedtime)  Synthroid 150 mcg (0.15 mg) oral tablet: 1 tab(s) orally once a day      Vital Signs:   T(F): 96.1 (10-03-19 @ 05:15), Max: 97.6 (10-02-19 @ 19:32)  HR: 67 (10-03-19 @ 05:15) (67 - 72)  BP: 133/67 (10-03-19 @ 05:15) (112/- - 133/67)  RR: 18 (10-03-19 @ 05:15) (18 - 18)  SpO2: --      10-02-19 @ 07:01  -  10-03-19 @ 06:39  --------------------------------------------------------  IN: 0 mL / OUT: 200 mL / NET: -200 mL        Physical Exam:   General: Patient resting comfortably in bed, NAD  HEENT: NCAT, conjunctiva clear, sclera white, PEERLA, EOMI, moist mucous membranes, normal orophaynx  Neck: No masses, no JVD, no cervical lymphadenopathy  Respiratory: good inspiratory effort; lungs clear to auscultation bilaterally  CV: Normal rate, regular rhythm, normal S1/S2, no rubs, gallops, murmurs  GI: abdomen soft, non-tender, non-distended, no masses, normal bowel sounds  Extremities: Well-perfused, no clubbing, no cyanosis, no lower extremity edema bilaterally  Skin: warm and dry  Neurology: AAOx3, mentating appropriately, follows commands, nonfocal    Labs:                         11.2   4.53  )-----------( 236      ( 03 Oct 2019 00:24 )             33.0     Neutophil% 66.2, Lymphocyte% 15.9, Monocyte% 12.8, Bands% 0.7 10-03-19 @ 00:24    03 Oct 2019 00:24    128    |  92     |  17     ----------------------------<  102    3.8     |  25     |  0.9      Ca    9.4        03 Oct 2019 00:24  Mg     1.7       03 Oct 2019 00:24    TPro  5.7    /  Alb  3.8    /  TBili  0.3    /  DBili  x      /  AST  12     /  ALT  10     /  AlkPhos  72     03 Oct 2019 00:24       pTT    36.4             ----< 1.04 INR  (10-03-19 @ 00:24)    12.00        PT                        Radiology: Hospital Day:  1d    Subjective:    Patient is a 73y old  Male who presents with a chief complaint of shortness of breath (02 Oct 2019 20:08)    There were no acute overnight events. The patient was seen and examined at the bedside. Patient reports improved pain and resolution of shortness of breath. Denies fever, chills, headache, dizziness, chest pain, palpitations, shortness of breath, abdominal pain, nausea, vomiting, diarrhea.    Past Medical Hx:   Hypothyroid  HTN (hypertension)  Lymphoma    Past Sx:  S/P lymph node biopsy  H/O thyroidectomy    Allergies:  erythromycin (Rash)  IV Contrast (Flushing; Rash)  Sulfa (Rash)    Current Meds:   Stand Meds:  chlorhexidine 4% Liquid 1 Application(s) Topical <User Schedule>  docusate sodium 100 milliGRAM(s) Oral three times a day  heparin  Injectable 5000 Unit(s) SubCutaneous every 8 hours  hydrochlorothiazide 25 milliGRAM(s) Oral daily  influenza   Vaccine 0.5 milliLiter(s) IntraMuscular once  levothyroxine 150 MICROGram(s) Oral daily  losartan 100 milliGRAM(s) Oral daily  metoprolol succinate  milliGRAM(s) Oral daily  pantoprazole    Tablet 40 milliGRAM(s) Oral before breakfast    PRN Meds:  acetaminophen   Tablet .. 650 milliGRAM(s) Oral every 6 hours PRN Mild Pain (1 - 3)  oxyCODONE    5 mG/acetaminophen 325 mG 1 Tablet(s) Oral every 6 hours PRN Severe Pain (7 - 10)    HOME MEDICATIONS:  losartan-hydroCHLOROthiazide 100 mg-25 mg oral tablet: 1 tab(s) orally once a day  metoprolol succinate 100 mg oral tablet, extended release: 1 tab(s) orally once a day  raNITIdine 300 mg oral capsule: 1 cap(s) orally once a day (at bedtime)  Synthroid 150 mcg (0.15 mg) oral tablet: 1 tab(s) orally once a day      Vital Signs:   T(F): 96.1 (10-03-19 @ 05:15), Max: 97.6 (10-02-19 @ 19:32)  HR: 67 (10-03-19 @ 05:15) (67 - 72)  BP: 133/67 (10-03-19 @ 05:15) (112/- - 133/67)  RR: 18 (10-03-19 @ 05:15) (18 - 18)  SpO2: --      10-02-19 @ 07:01  -  10-03-19 @ 06:39  --------------------------------------------------------  IN: 0 mL / OUT: 200 mL / NET: -200 mL        Physical Exam:   General: Patient resting comfortably in bed, NAD  HEENT: NCAT, conjunctiva clear, sclera white, PEERLA, EOMI, moist mucous membranes, normal orophaynx  Neck: No masses, no JVD, no cervical lymphadenopathy  Respiratory: right sided chest tube in place; breath sounds present bilaterally; rales over right lower lung field  CV: Normal rate, regular rhythm, normal S1/S2, no rubs, gallops, murmurs  GI: abdomen soft, non-tender, non-distended, no masses, normal bowel sounds  Extremities: Well-perfused, no clubbing, no cyanosis, no lower extremity edema bilaterally  Skin: warm and dry  Neurology: AAOx3, mentating appropriately, follows commands, nonfocal    Labs:                         11.2   4.53  )-----------( 236      ( 03 Oct 2019 00:24 )             33.0     Neutophil% 66.2, Lymphocyte% 15.9, Monocyte% 12.8, Bands% 0.7 10-03-19 @ 00:24    03 Oct 2019 00:24    128    |  92     |  17     ----------------------------<  102    3.8     |  25     |  0.9      Ca    9.4        03 Oct 2019 00:24  Mg     1.7       03 Oct 2019 00:24    TPro  5.7    /  Alb  3.8    /  TBili  0.3    /  DBili  x      /  AST  12     /  ALT  10     /  AlkPhos  72     03 Oct 2019 00:24       pTT    36.4             ----< 1.04 INR  (10-03-19 @ 00:24)    12.00        PT                        Radiology:

## 2019-10-03 NOTE — DISCHARGE NOTE PROVIDER - CARE PROVIDER_API CALL
Ham Carney)  Hematology; Internal Medicine; Medical Oncology  [address] [place] [address]  Phone: [phone]  Fax: [phone]  Follow Up Time: 2 weeks    Misbah Merrill)  Surgery; Thoracic Surgery  [address] [place] [address], [address]  [place] [address]  Phone: [phone]  Fax: [phone]  Follow Up Time: 2 weeks

## 2019-10-03 NOTE — CONSULT NOTE ADULT - ATTENDING COMMENTS
The patient was also seen and examined by myself. I agree with Dr. Garcia's (Hem-Onc fellow) note above, Situation discussed with her and the patient.  The mediastinal adenopathy is probably from his small lymphocytic lymphoma especially in view of no change from previous. However, mediastinoscopy may be needed for the treatment of the lung tumor (most likely non-lymphomatous). Further recommendations once the pathology of the lung and adrenal biopsy results are available.

## 2019-10-03 NOTE — DISCHARGE NOTE PROVIDER - CARE PROVIDERS DIRECT ADDRESSES
,[email or web address]

## 2019-10-03 NOTE — PROGRESS NOTE ADULT - SUBJECTIVE AND OBJECTIVE BOX
Interventional Radiology Follow- Up Note    73y Male s/p Right lung biopsy and chest tube placement on 10/2 in Interventional Radiology with Dr Farnsworth    Vitals: T(F): 96.1 (10-03-19 @ 05:15), Max: 97.6 (10-02-19 @ 19:32)  HR: 67 (10-03-19 @ 05:15) (67 - 72)  BP: 133/67 (10-03-19 @ 05:15) (112/- - 133/67)  RR: 18 (10-03-19 @ 05:15) (18 - 18)  SpO2: --  Wt(kg): --    LABS:                        11.8   3.52  )-----------( 244      ( 03 Oct 2019 05:35 )             35.0     10-03    133<L>  |  93<L>  |  15  ----------------------------<  87  3.8   |  28  |  0.9    Ca    9.5      03 Oct 2019 05:35  Mg     1.8     10-03    TPro  6.0  /  Alb  4.0  /  TBili  0.4  /  DBili  x   /  AST  13  /  ALT  10  /  AlkPhos  74  10-03    PT/INR - ( 03 Oct 2019 00:24 )   PT: 12.00 sec;   INR: 1.04 ratio         PTT - ( 03 Oct 2019 00:24 )  PTT:36.4 sec    PHYSICAL EXAM:  General: Nontoxic, in NAD    Impression:   73 years old male from home with PMHx of HTN, hypothyroidism, small B-cell lymphoma (diagnosed in 2015, not on any treatment), right lung mass (discovered 1 month ago) s/p needle biopsy on 10/2, presented to [facility] with shortness of breath after procedure. On repeat CXR, patient was found to have a right-sided pneumothorax after which a chest tube was placed on 10/2.  - No acute events overnight, CXR this morning showed no pneumothorax  - Chest tube off suction to water seal, will repeat CXR at 11:00 am this morning  - Plan for adrenal nodule biopsy today 10/3/2019  - Please keep NPO  - IR will continue to follow     Please call Interventional Radiology [phone] with any questions, concerns, or issues regarding above. Interventional Radiology Follow- Up Note    73y Male s/p Right lung biopsy and chest tube placement on 10/2 in Interventional Radiology with Dr Farnsworth    Vitals: T(F): 96.1 (10-03-19 @ 05:15), Max: 97.6 (10-02-19 @ 19:32)  HR: 67 (10-03-19 @ 05:15) (67 - 72)  BP: 133/67 (10-03-19 @ 05:15) (112/- - 133/67)  RR: 18 (10-03-19 @ 05:15) (18 - 18)  SpO2: --  Wt(kg): --    LABS:                        11.8   3.52  )-----------( 244      ( 03 Oct 2019 05:35 )             35.0     10-03    133<L>  |  93<L>  |  15  ----------------------------<  87  3.8   |  28  |  0.9    Ca    9.5      03 Oct 2019 05:35  Mg     1.8     10-03    TPro  6.0  /  Alb  4.0  /  TBili  0.4  /  DBili  x   /  AST  13  /  ALT  10  /  AlkPhos  74  10-03    PT/INR - ( 03 Oct 2019 00:24 )   PT: 12.00 sec;   INR: 1.04 ratio         PTT - ( 03 Oct 2019 00:24 )  PTT:36.4 sec    PHYSICAL EXAM:  General: Nontoxic, in NAD    Impression:   73 years old male from home with PMHx of HTN, hypothyroidism, small B-cell lymphoma (diagnosed in 2015, not on any treatment), right lung mass (discovered 1 month ago) s/p needle biopsy on 10/2, presented to [facility] with shortness of breath after procedure. On repeat CXR, patient was found to have a right-sided pneumothorax after which a chest tube was placed on 10/2.  - No acute events overnight, CXR this morning showed no pneumothorax  - Chest tube off suction to water seal, will repeat CXR at 11:00 am this morning  - Plan for possible adrenal biopsy today 10/3/2019  - Please keep NPO  - IR will continue to follow     Please call Interventional Radiology [phone] with any questions, concerns, or issues regarding above. Interventional Radiology Follow- Up Note    73y Male s/p Right lung biopsy and chest tube placement on 10/2 in Interventional Radiology with Dr Farnsworth    Vitals: T(F): 96.1 (10-03-19 @ 05:15), Max: 97.6 (10-02-19 @ 19:32)  HR: 67 (10-03-19 @ 05:15) (67 - 72)  BP: 133/67 (10-03-19 @ 05:15) (112/- - 133/67)  RR: 18 (10-03-19 @ 05:15) (18 - 18)  SpO2: --  Wt(kg): --    LABS:                        11.8   3.52  )-----------( 244      ( 03 Oct 2019 05:35 )             35.0     10-03    133<L>  |  93<L>  |  15  ----------------------------<  87  3.8   |  28  |  0.9    Ca    9.5      03 Oct 2019 05:35  Mg     1.8     10-03    TPro  6.0  /  Alb  4.0  /  TBili  0.4  /  DBili  x   /  AST  13  /  ALT  10  /  AlkPhos  74  10-03    PT/INR - ( 03 Oct 2019 00:24 )   PT: 12.00 sec;   INR: 1.04 ratio         PTT - ( 03 Oct 2019 00:24 )  PTT:36.4 sec    PHYSICAL EXAM:  General: Nontoxic, in NAD    Impression:   73 years old male from home with PMHx of HTN, hypothyroidism, small B-cell lymphoma (diagnosed in 2015, not on any treatment), right lung mass (discovered 1 month ago) s/p needle biopsy on 10/2, presented to [facility] with shortness of breath after procedure. On repeat CXR, patient was found to have a right-sided pneumothorax after which a chest tube was placed on 10/2.  - No acute events overnight, CXR this morning showed no pneumothorax  - Chest tube off suction to water seal, will repeat CXR at 11:00 am this morning  - No need for adrenal biopsy at this time - images were re-reviewed  - IR will continue to follow     Please call Interventional Radiology [phone] with any questions, concerns, or issues regarding above.

## 2019-10-04 NOTE — CHART NOTE - NSCHARTNOTEFT_GEN_A_CORE
<<<RESIDENT DISCHARGE NOTE>>>     GABE EDEN  MRN-[id number]    PATIENT HAS BEEN CLEARED BY PULMONOLOGY, IR, AND HEMATOLOGY/ONCOLOGY FOR DISCHARGE HOME.    VITAL SIGNS:  T(F): 96.7 (10-04-19 @ 13:37), Max: 97.1 (10-03-19 @ 19:59)  HR: 77 (10-04-19 @ 13:37)  BP: 133/62 (10-04-19 @ 13:37)  SpO2: 95% (10-04-19 @ 08:03)      PHYSICAL EXAMINATION:  General: Patient resting comfortably in bed, NAD  HEENT: NCAT, conjunctiva clear, sclera white, PEERLA, EOMI, moist mucous membranes, normal orophaynx  Neck: No masses, no JVD, no cervical lymphadenopathy  Respiratory: breath sounds present bilaterally; lungs clear  CV: Normal rate, regular rhythm, normal S1/S2, no rubs, gallops, murmurs  GI: abdomen soft, non-tender, non-distended, no masses, normal bowel sounds  Extremities: Well-perfused, no clubbing, no cyanosis, no lower extremity edema bilaterally  Skin: warm and dry  Neurology: AAOx3, mentating appropriately, follows commands, nonfocal    TEST RESULTS:                        11.7   3.66  )-----------( 236      ( 04 Oct 2019 05:13 )             34.0       10-04    130<L>  |  93<L>  |  15  ----------------------------<  89  3.8   |  26  |  0.8    Ca    9.0      04 Oct 2019 05:13  Mg     1.8     10-03    TPro  6.0  /  Alb  4.0  /  TBili  0.4  /  DBili  x   /  AST  13  /  ALT  10  /  AlkPhos  74  10-03      FINAL DISCHARGE INTERVIEW:  Resident(s) Present: (Name: Leanne Singh), RN Present: (Name:  Tita Covington)    DISCHARGE MEDICATION RECONCILIATION  reviewed with Attending (Name: Marlon Aden)    DISPOSITION:   [ x ] Home,    [  ] Home with Visiting Nursing Services,   [    ]  SNF/ NH,    [   ] Acute Rehab (4A),   [   ] Other (Specify:_________)

## 2019-10-04 NOTE — PROGRESS NOTE ADULT - SUBJECTIVE AND OBJECTIVE BOX
Patient is a 73y old  Male who presents with a chief complaint of shortness of breath (03 Oct 2019 17:59)      Subjective: Pt seen and examined ,reports feeling fine . Chest tube is removed ,and he is  on room air , not SOB.       Vital Signs Last 24 Hrs  T(C): 35.9 (04 Oct 2019 05:38), Max: 36.2 (03 Oct 2019 13:43)  T(F): 96.6 (04 Oct 2019 05:38), Max: 97.2 (03 Oct 2019 13:43)  HR: 73 (04 Oct 2019 05:38) (68 - 73)  BP: 128/59 (04 Oct 2019 05:38) (121/58 - 129/56)  BP(mean): --  RR: 18 (04 Oct 2019 05:38) (18 - 18)  SpO2: 95% (04 Oct 2019 08:03) (94% - 95%)    PHYSICAL EXAM  General: adult in NAD  HEENT: clear oropharynx, anicteric sclera, pink conjunctiva  Neck: supple  CV: normal S1/S2 with no murmur rubs or gallops  Lungs: positive air movement b/l ant lungs,clear to auscultation, no wheezes, no rales  Abdomen: soft non-tender non-distended, no hepatosplenomegaly  Ext: no clubbing cyanosis or edema  Skin: no rashes and no petechiae  Neuro: alert and oriented X 4, no focal deficits    MEDICATIONS  (STANDING):  chlorhexidine 4% Liquid 1 Application(s) Topical <User Schedule>  docusate sodium 100 milliGRAM(s) Oral three times a day  heparin  Injectable 5000 Unit(s) SubCutaneous every 8 hours  hydrochlorothiazide 25 milliGRAM(s) Oral daily  influenza   Vaccine 0.5 milliLiter(s) IntraMuscular once  levothyroxine 150 MICROGram(s) Oral daily  losartan 100 milliGRAM(s) Oral daily  metoprolol succinate  milliGRAM(s) Oral daily  pantoprazole    Tablet 40 milliGRAM(s) Oral before breakfast    MEDICATIONS  (PRN):  acetaminophen   Tablet .. 650 milliGRAM(s) Oral every 6 hours PRN Mild Pain (1 - 3)  oxyCODONE    5 mG/acetaminophen 325 mG 1 Tablet(s) Oral every 6 hours PRN Severe Pain (7 - 10)      LABS:                          11.7   3.66  )-----------( 236      ( 04 Oct 2019 05:13 )             34.0         Mean Cell Volume : 84.6 fL  Mean Cell Hemoglobin : 29.1 pg  Mean Cell Hemoglobin Concentration : 34.4 g/dL  Auto Neutrophil # : 2.55 K/uL  Auto Lymphocyte # : 0.53 K/uL  Auto Monocyte # : 0.42 K/uL  Auto Eosinophil # : 0.10 K/uL  Auto Basophil # : 0.04 K/uL  Auto Neutrophil % : 69.7 %  Auto Lymphocyte % : 14.5 %  Auto Monocyte % : 11.5 %  Auto Eosinophil % : 2.7 %  Auto Basophil % : 1.1 %      Serial CBC's  10-04 @ 05:13  Hct-34.0 / Hgb-11.7 / Plat-236 / RBC-4.02 / WBC-3.66  Serial CBC's  10-03 @ 05:35  Hct-35.0 / Hgb-11.8 / Plat-244 / RBC-4.11 / WBC-3.52  Serial CBC's  10-03 @ 00:24  Hct-33.0 / Hgb-11.2 / Plat-236 / RBC-3.89 / WBC-4.53      10-04    130<L>  |  93<L>  |  15  ----------------------------<  89  3.8   |  26  |  0.8    Ca    9.0      04 Oct 2019 05:13  Mg     1.8     10-03    TPro  6.0  /  Alb  4.0  /  TBili  0.4  /  DBili  x   /  AST  13  /  ALT  10  /  AlkPhos  74  10-03      PT/INR - ( 03 Oct 2019 00:24 )   PT: 12.00 sec;   INR: 1.04 ratio         PTT - ( 03 Oct 2019 00:24 )  PTT:36.4 sec                            BLOOD SMEAR INTERPRETATION:       RADIOLOGY & ADDITIONAL STUDIES:    < from: NM PET/CT Onc FDG Skull to Thigh, Inital (09.13.19 @ 13:08) >  FINDINGS:    Head/Neck: Focus of abnormal FDG uptake within the right true vocal cord,   SUV 11.9.    Thorax:   Interval enlargement of a right FDG avid mass/opacity within   the superior right lower lobe, now measuring 5.3 x 3.1 (previously 4.1 x   2), SUV 19.4. Additional right upper lobe 5 mm solid nodule is FDG avid,   SUV 1.6 and positive on the non-attenuated corrected images (axial image   212).    There is an stable right hilar lymphadenopathy measuring up to 3 x 1.3   cm, SUV 5.2 (image 213), and left suprahilar adenopathy measuring up to   3.5 x 1.2 cm, SUV 18.1 (image 220). An additional 1.2 x 0.8 cm right   paratracheal lymph node is FDG avid, SUV 3.8.     Abdomen/Pelvis: Physiologic GI/  activity.  Mildly prominent   para-aortic adenopathy is not FDG avid.    Musculoskeletal :  No suspicious hypermetabolic osseous lesions.    Additional CT findings:    < end of copied text > Patient is a 73y old  Male who presents with a chief complaint of shortness of breath (03 Oct 2019 17:59)      Subjective: Pt seen and examined, reports feeling fine . Chest tube is removed, and he is  on room air without any SOB.       Vital Signs Last 24 Hrs  T(C): 35.9 (04 Oct 2019 05:38), Max: 36.2 (03 Oct 2019 13:43)  T(F): 96.6 (04 Oct 2019 05:38), Max: 97.2 (03 Oct 2019 13:43)  HR: 73 (04 Oct 2019 05:38) (68 - 73)  BP: 128/59 (04 Oct 2019 05:38) (121/58 - 129/56)  BP(mean): --  RR: 18 (04 Oct 2019 05:38) (18 - 18)  SpO2: 95% (04 Oct 2019 08:03) (94% - 95%)    PHYSICAL EXAM  General: adult in NAD  HEENT: clear oropharynx, anicteric sclera, pink conjunctiva  Neck: supple  CV: normal S1/S2 with no murmur rubs or gallops  Lungs: positive air movement b/l ant lungs,clear to auscultation, no wheezes, no rales  Abdomen: soft non-tender non-distended, no hepatosplenomegaly  Ext: no clubbing cyanosis or edema  Skin: no rashes and no petechiae  Neuro: alert and oriented X 4, no focal deficits    MEDICATIONS  (STANDING):  chlorhexidine 4% Liquid 1 Application(s) Topical <User Schedule>  docusate sodium 100 milliGRAM(s) Oral three times a day  heparin  Injectable 5000 Unit(s) SubCutaneous every 8 hours  hydrochlorothiazide 25 milliGRAM(s) Oral daily  influenza   Vaccine 0.5 milliLiter(s) IntraMuscular once  levothyroxine 150 MICROGram(s) Oral daily  losartan 100 milliGRAM(s) Oral daily  metoprolol succinate  milliGRAM(s) Oral daily  pantoprazole    Tablet 40 milliGRAM(s) Oral before breakfast    MEDICATIONS  (PRN):  acetaminophen   Tablet .. 650 milliGRAM(s) Oral every 6 hours PRN Mild Pain (1 - 3)  oxyCODONE    5 mG/acetaminophen 325 mG 1 Tablet(s) Oral every 6 hours PRN Severe Pain (7 - 10)      LABS:                          11.7   3.66  )-----------( 236      ( 04 Oct 2019 05:13 )             34.0         Mean Cell Volume : 84.6 fL  Mean Cell Hemoglobin : 29.1 pg  Mean Cell Hemoglobin Concentration : 34.4 g/dL  Auto Neutrophil # : 2.55 K/uL  Auto Lymphocyte # : 0.53 K/uL  Auto Monocyte # : 0.42 K/uL  Auto Eosinophil # : 0.10 K/uL  Auto Basophil # : 0.04 K/uL  Auto Neutrophil % : 69.7 %  Auto Lymphocyte % : 14.5 %  Auto Monocyte % : 11.5 %  Auto Eosinophil % : 2.7 %  Auto Basophil % : 1.1 %      Serial CBC's  10-04 @ 05:13  Hct-34.0 / Hgb-11.7 / Plat-236 / RBC-4.02 / WBC-3.66  Serial CBC's  10-03 @ 05:35  Hct-35.0 / Hgb-11.8 / Plat-244 / RBC-4.11 / WBC-3.52  Serial CBC's  10-03 @ 00:24  Hct-33.0 / Hgb-11.2 / Plat-236 / RBC-3.89 / WBC-4.53      10-04    130<L>  |  93<L>  |  15  ----------------------------<  89  3.8   |  26  |  0.8    Ca    9.0      04 Oct 2019 05:13  Mg     1.8     10-03    TPro  6.0  /  Alb  4.0  /  TBili  0.4  /  DBili  x   /  AST  13  /  ALT  10  /  AlkPhos  74  10-03      PT/INR - ( 03 Oct 2019 00:24 )   PT: 12.00 sec;   INR: 1.04 ratio         PTT - ( 03 Oct 2019 00:24 )  PTT:36.4 sec                            BLOOD SMEAR INTERPRETATION:       RADIOLOGY & ADDITIONAL STUDIES:    < from: NM PET/CT Onc FDG Skull to Thigh, Inital (09.13.19 @ 13:08) >  FINDINGS:    Head/Neck: Focus of abnormal FDG uptake within the right true vocal cord,   SUV 11.9.    Thorax:   Interval enlargement of a right FDG avid mass/opacity within   the superior right lower lobe, now measuring 5.3 x 3.1 (previously 4.1 x   2), SUV 19.4. Additional right upper lobe 5 mm solid nodule is FDG avid,   SUV 1.6 and positive on the non-attenuated corrected images (axial image   212).    There is an stable right hilar lymphadenopathy measuring up to 3 x 1.3   cm, SUV 5.2 (image 213), and left suprahilar adenopathy measuring up to   3.5 x 1.2 cm, SUV 18.1 (image 220). An additional 1.2 x 0.8 cm right   paratracheal lymph node is FDG avid, SUV 3.8.     Abdomen/Pelvis: Physiologic GI/  activity.  Mildly prominent   para-aortic adenopathy is not FDG avid.    Musculoskeletal :  No suspicious hypermetabolic osseous lesions.    Additional CT findings:    < end of copied text >

## 2019-10-04 NOTE — PROGRESS NOTE ADULT - ASSESSMENT
73 years old male from home with PMHx of HTN, hypothyroidism, small B-cell lymphoma (diagnosed in 2015, not on any treatment), right lung mass (discovered 1 month ago) s/p needle biopsy today, presented to [facility] with shortness of breath after procedure.     Pt was diagnosed with Small lymphocytic lymphoma in 2015 , and was clinically stable since then . Last routine F/U visit  was in May , 2019 . At that time pt had no symptoms so imaging was deferred . Pt was later seen by GI for GERD and routine chest xray showed opacity , he was referred to pulm and from there had CT chest that showed lung mass and lymphadenopathy .  Pt also had PET scan done. S/p biopsy of lung mass by IR , developed pneumothorax after procedure . Has chest tube .       # H/O Small Lymphocytic lymphoma / New lung mass / Right lower lobe mass with mediastinal lymphadenopathy    - Could be  transformation vrs new Primary lung malignancy   - Will f/u with biopsy results.  - Pt asymptomatic , mild normocytic anemia .   - LDH was normal in May , repeat LDH now .   - Recommend mediastinoscopy ( for staging as we are  suspecting lung ca ) . CTS f/u .   - Will need a bone marrow if biopsy shows transformation.  - MRI of brain noted , no intracranial metastasis .   - Will f/u with pt . 73 years old male from home with PMHx of HTN, hypothyroidism, small B-cell lymphoma (diagnosed in 2015, not on any treatment), right lung mass (discovered 1 month ago), s/p needle biopsy today, presented to [facility] with shortness of breath after procedure.     Pt was diagnosed with Small lymphocytic lymphoma in 2015 , and was clinically stable since then . Last routine F/U visit  was in May , 2019 . At that time pt had no symptoms so imaging was deferred . Pt was later seen by GI for GERD and routine chest xray showed opacity , he was referred to pulm and from there had CT chest that showed lung mass and lymphadenopathy .  Pt also had PET scan done. S/p biopsy of lung mass by IR , developed pneumothorax after procedure . Has chest tube .       # H/O Small Lymphocytic lymphoma / New lung mass / Right lower lobe mass with mediastinal lymphadenopathy    - Could be  transformation vs new Primary lung malignancy, but clinical presentation is more for a lung carcinoma.   - Will f/u with biopsy results.  - Pt asymptomatic , mild normocytic anemia .   - LDH was normal in May , repeat LDH now .   - Recommend mediastinoscopy ( for staging and treatment planning once the diagnosis of suspicion of lung ca is confirmed) . CTS f/u .   - Will need a bone marrow if lymph node biopsy shows transformation.  - MRI of brain noted , no intracranial metastasis .   - Will f/u with pt .

## 2019-10-04 NOTE — DISCHARGE NOTE NURSING/CASE MANAGEMENT/SOCIAL WORK - PATIENT PORTAL LINK FT
You can access the FollowMyHealth Patient Portal offered by [facility] by registering at the following website: [email or web address]. By joining [facility]’s FollowMyHealth portal, you will also be able to view your health information using other applications (apps) compatible with our system.

## 2019-10-04 NOTE — PROGRESS NOTE ADULT - SUBJECTIVE AND OBJECTIVE BOX
GABE EDEN  73y Male    CHIEF COMPLAINT:    Patient is a 73y old  Male who presents with a chief complaint of shortness of breath (04 Oct 2019 11:21)      INTERVAL HPI/OVERNIGHT EVENTS:    Patient seen and examined. No sob. No cp    ROS: All other systems are negative.    Vital Signs:    T(F): 96.7 (10-04-19 @ 13:37), Max: 97.1 (10-03-19 @ 19:59)  HR: 77 (10-04-19 @ 13:37) (68 - 77)  BP: 133/62 (10-04-19 @ 13:37) (121/58 - 133/62)  RR: 18 (10-04-19 @ 13:37) (18 - 18)  SpO2: 95% (10-04-19 @ 08:03) (94% - 95%)  I&O's Summary    03 Oct 2019 07:01  -  04 Oct 2019 07:00  --------------------------------------------------------  IN: 670 mL / OUT: 300 mL / NET: 370 mL    04 Oct 2019 07:01  -  04 Oct 2019 15:43  --------------------------------------------------------  IN: 350 mL / OUT: 350 mL / NET: 0 mL      Daily     Daily   CAPILLARY BLOOD GLUCOSE          PHYSICAL EXAM:    GENERAL:  NAD  SKIN: No rashes or lesions  HENT: Atraumatic Normocephalic. PERRL. Moist membranes.  NECK: Supple, No JVD. No lymphadenopathy.  PULMONARY: CTA B/L. No wheezing. No rales  CVS: Normal S1, S2. Rate and Rhythm are regular. No murmurs.  ABDOMEN/GI: Soft, Nontender, Nondistended; BS present  EXTREMITIES: Peripheral pulses intact. No edema B/L LE.  NEUROLOGIC:  No motor or sensory deficit.  PSYCH: Alert & oriented x 3    Consultant(s) Notes Reviewed:  [x ] YES  [ ] NO  Care Discussed with Consultants/Other Providers [ x] YES  [ ] NO    EKG reviewed  Telemetry reviewed    LABS:                        11.7   3.66  )-----------( 236      ( 04 Oct 2019 05:13 )             34.0     10-04    130<L>  |  93<L>  |  15  ----------------------------<  89  3.8   |  26  |  0.8    Ca    9.0      04 Oct 2019 05:13  Mg     1.8     10-03    TPro  6.0  /  Alb  4.0  /  TBili  0.4  /  DBili  x   /  AST  13  /  ALT  10  /  AlkPhos  74  10-03    PT/INR - ( 03 Oct 2019 00:24 )   PT: 12.00 sec;   INR: 1.04 ratio         PTT - ( 03 Oct 2019 00:24 )  PTT:36.4 sec          RADIOLOGY & ADDITIONAL TESTS:    < from: MR Head w/wo IV Cont (10.03.19 @ 15:24) >    IMPRESSION:    No MRI evidence to suggest intracerebral metastasis.    < end of copied text >    Imaging or report Personally Reviewed:  [ ] YES  [ ] NO    Medications:  Standing  chlorhexidine 4% Liquid 1 Application(s) Topical <User Schedule>  docusate sodium 100 milliGRAM(s) Oral three times a day  heparin  Injectable 5000 Unit(s) SubCutaneous every 8 hours  hydrochlorothiazide 25 milliGRAM(s) Oral daily  influenza   Vaccine 0.5 milliLiter(s) IntraMuscular once  levothyroxine 150 MICROGram(s) Oral daily  losartan 100 milliGRAM(s) Oral daily  metoprolol succinate  milliGRAM(s) Oral daily  pantoprazole    Tablet 40 milliGRAM(s) Oral before breakfast    PRN Meds  acetaminophen   Tablet .. 650 milliGRAM(s) Oral every 6 hours PRN  oxyCODONE    5 mG/acetaminophen 325 mG 1 Tablet(s) Oral every 6 hours PRN      Case discussed with resident    Care discussed with pt/family

## 2019-10-04 NOTE — PROGRESS NOTE ADULT - ATTENDING COMMENTS
Patient also seen by myself. I agree with Dr. Garcia's note above. The situation was discussed with her and the patient. All questions answered.

## 2019-10-04 NOTE — PROGRESS NOTE ADULT - ASSESSMENT
A 73 years old male recently diagnosed with Rt. Lung mass had lung biopsy done yesterday. Post procedure CXR showed no PTX and he was discharged home. Around 6 PM at home he started experiencing Rt. sided cp and sob. He contacted the IR and they told him to return for evaluation. CT chest was done that showed PTX. He had chest tube placed and admitted to floor.     CT chest. Large Rt sided PTX. Check official report  EKG: NSR @ 73/min. PVC'S. (Interpreted by me.)      1. Post procedure Rt. Sided PTX S/P chest tube insertion and removal  2. Rt. Lung mass  3. Small B-Cell Lymphoma  4. HTN  5. Hypothyroidism  6. Hyponatremia         PLAN:    . Hem/Onc eval noted. Recommended mediastinal biopsy for staging  . Care D/W the CTS Dr. Falcon.  As the pt had recent lung puncture cannot do VATS now. He gave him out pt F/U appt on next Wednesday.   . Care D/W the Pulmonary. Cleared the pt to D/C  . Brain MRI is unremarkable  . Pain control  . Cont his home meds. . D/C home    * Med rec reviewed. Plan of care D/W the pt. Time spent 38 minutes.

## 2019-10-07 PROBLEM — C85.90 NON-HODGKIN LYMPHOMA, UNSPECIFIED, UNSPECIFIED SITE: Chronic | Status: ACTIVE | Noted: 2018-04-24

## 2019-10-14 PROBLEM — R94.2 ABNORMAL PET SCAN, LUNG: Status: ACTIVE | Noted: 2019-01-01

## 2019-10-14 PROBLEM — R91.8 HILAR ENLARGEMENT: Status: ACTIVE | Noted: 2019-01-01

## 2019-10-14 NOTE — CONSULT LETTER
[Dear  ___] : Dear  [unfilled], [Consult Letter:] : I had the pleasure of evaluating your patient, [unfilled]. [Consult Closing:] : Thank you very much for allowing me to participate in the care of this patient.  If you have any questions, please do not hesitate to contact me. [Sincerely,] : Sincerely, [FreeTextEntry2] : Feliz Patel [FreeTextEntry1] : 72 y/o M presents to our office today for followup consultation for mediastinal lymphadenopathy and is accompanied by his nieces. Radiologic images reviewed. Surgical options reviewed with patient and his nieces in accompaniment. The patient has a known biopsy proven right lower lobe lung cancer.  Complicating the picture is a fairly avid left hilar conglomeration of lymph nodes.  Careful examination of this conglomeration suggests that it is actually fairly confluent with the lung itself, such as level 11 and 12 lymph nodes.  There is no left lung mass.  There are additionally some faintly avid central mediastinal nodes.  Complicating this picture is that the patient has a personal past history of lymphoma.  \par \par He needs at this point full staging, to be done with pathologic evaluation of the areas of uptake on the PET.  I would approach this systematically with LEFT thoracoscopy and direct biopsy of the nodes here after careful dissection away from the PA and PV.  If those were negative on frozen, he would then undergo a cervical mediastinoscopy.  If that was negative, I would defer until a later date (to yield recovery from the left thoracoscopy) and plan for right sided resection.  If either of the sites were positive for a lung carcinoma, I would plan for mediport and to terminate the operation.  \par \par At present he will have full PFTs done with Dr. Mims and obtain cardiac clearance with Dr. Kirkpatrick. Tentatively, he will proceed to the OR on 10/7/19 for a Robotic left thoracoscopy, possible mediastinoscopy, possible Mediport with fluoroscopy. \par  [FreeTextEntry3] : Misbah Merrill M.D.\par Chief, Division of Thoracic Surgery\par Department of Cardiothoracic Surgery\par  [DrLottie  ___] : Dr. LUCIA

## 2019-10-14 NOTE — PHYSICAL EXAM
[Sclera] : the sclera and conjunctiva were normal [PERRL With Normal Accommodation] : pupils were equal in size, round, and reactive to light [Extraocular Movements] : extraocular movements were intact [] : no respiratory distress [Auscultation Breath Sounds / Voice Sounds] : lungs were clear to auscultation bilaterally [Heart Rate And Rhythm] : heart rate was normal and rhythm regular [Heart Sounds] : normal S1 and S2 [Heart Sounds Gallop] : no gallops [Murmurs] : no murmurs [Heart Sounds Pericardial Friction Rub] : no pericardial rub [Abnormal Walk] : normal gait [Nail Clubbing] : no clubbing  or cyanosis of the fingernails [Musculoskeletal - Swelling] : no joint swelling seen [Motor Tone] : muscle strength and tone were normal [Deep Tendon Reflexes (DTR)] : deep tendon reflexes were 2+ and symmetric [Sensation] : the sensory exam was normal to light touch and pinprick [No Focal Deficits] : no focal deficits [Oriented To Time, Place, And Person] : oriented to person, place, and time [Impaired Insight] : insight and judgment were intact [Affect] : the affect was normal

## 2019-10-14 NOTE — REASON FOR VISIT
[Follow-Up: _____] : a [unfilled] follow-up visit [Family Member] : family member [FreeTextEntry1] : mediastinal lymphadenopathy

## 2019-10-14 NOTE — DATA REVIEWED
[FreeTextEntry1] : \par EXAM: PETCT [facility] ONC FDG INIT - 09/13/2019 \par \par INTERPRETATION: FDG PET CT STUDY initial treatment strategy \par REASON: TUMOR IMAGING - PET with concurrently acquired CT for attenuation correction and anatomic localization; skull base to mid - thigh . \par \par CPT code 87956. \par \par Fasting blood glucose level: 85 mg/dl \par \par HISTORY: Lung nodule. 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 \par \par TECHNIQUE: Approximately 45 minutes after the intravenous administration of 10.4 mCi 18-Fluorine FDG, whole body PET images were acquired from base of skull to mid - thigh. \par \par CT protocol used for this PET/CT study is designed for attenuation correction and anatomic localization of PET findings. This  CT is not designed to replace state-of-the-art diagnostic CT scans for specific imaging protocols of different body parts. \par \par  Injection site:Right hand. \par COMPARISON : No prior PET/CTs. Correlation with CT chest performed 8/29/2019. \par \par FINDINGS: \par \par Head/Neck: Focus of abnormal FDG uptake within the right true vocal cord, SUV 11.9. \par \par Thorax: Interval enlargement of a right FDG avid mass/opacity within the superior right lower lobe, now measuring 5.3 x 3.1 (previously 4.1 x 2), SUV 19.4. Additional right upper lobe 5 mm solid nodule is FDG avid, SUV 1.6 and positive on the non-attenuated corrected images (axial image 212). \par \par There is an stable right hilar lymphadenopathy measuring up to 3 x 1.3 cm, SUV 5.2 (image 213), and left suprahilar adenopathy measuring up to 3.5 x 1.2 cm, SUV 18.1 (image 220). An additional 1.2 x 0.8 cm right paratracheal lymph node is FDG avid, SUV 3.8. \par \par Abdomen/Pelvis: Physiologic GI/  activity. Mildly prominent para-aortic adenopathy is not FDG avid. \par \par Musculoskeletal : No suspicious hypermetabolic osseous lesions. \par \par Additional CT findings: \par Emphysematous changes. \par Prostatomegaly measuring up to 5 cm with coarse parenchymal calcifications. \par Colonic diverticulosis. \par Right gluteal fatty atrophy. \par Atherosclerotic disease. \par \par IMPRESSION: \par \par Multiple areas of pathologic FDG uptake consistent with bilateral tumor activity, including: \par \par Interval enlargement of a superior right lower lobe mass, now measuring 5.3 x 3.1, SUV 19.4. \par \par 5 mm right upper lobe solid nodule, SUV 1.6, FDG positive on non-attenuated corrected images (axial image 212). \par \par Mediastinal adenopathy as above, maximum SUV 18.1 in the left suprahilar region. \par \par Right true vocal cord, SUV 11.9, which may be secondary to the patient's known squamous papilloma. Direct visualization is recommended if not recently performed. \par \par ================================================================================\par \par EXAM: MR BRAIN [facility] - 10/03/2019 \par \par INTERPRETATION: Clinical History / Reason for exam: History of small cell lymphoma, lung mass, for evaluation of metastatic disease. \par \par MRI OF THE BRAIN WITHOUT AND WITH CONTRAST \par \par TECHNIQUE: \par \par Multiplanar multisequence imaging of the brain was performed before and after the intravenous administration of 7.5 cc of Gadavist. \par \par FINDINGS: \par \par Motion artifact limits the examination. \par \par The third and lateral ventricles are mildly enlarged as are the cortical sulci consistent with a mild degree of cortical atrophy. The fourth ventricle is normal in size and position. \par \par There is no shift of the midline structures. \par \par On the T2 and FLAIR images there are scattered punctate foci of hyperintense signal intensity in the periventricular white matter. These findings are nonspecific and differential diagnostic possibilities include chronic ischemic change, foci of gliosis or demyelination. \par \par Following the intravenous administration of Gadavist, no areas of abnormal enhancement are demonstrated within the brain parenchyma. \par \par IMPRESSION: \par \par No MRI evidence to suggest intracerebral metastasis. \par \par

## 2019-10-14 NOTE — HISTORY OF PRESENT ILLNESS
[FreeTextEntry1] : 72 y/o M presents to our office today for mediastinal lymphadenopathy referred to our office by Dr. Mims. He is accompanied by his nieces. He reports having recently had his lung mass biopsied by IR, which subsequently resulted in a pneumothorax for which he returned to the hospital for chest tube placement. His PMH is significant for hypothyroid, hypertension, small B-cell lymphoma, is a former smoker, having smoked 2 ppd/32 years, quit 33 years. He most recently smoked 6-7 cigars a day for 20 years, which he quit 11 months ago and endorses 9/11 exposure. \par He shares a house with his sister, is a retired  for [employer] and has a boxer, Rosamaria, at home. \par \par His healthcare team is as follows:\par PMD: none\par Cardio: none\par Pulm: Suzan\par EPS: none\par Hem/onc: Lisandraian\par Renal: none\par Endocrine: Bertram Lopez\par GI: none\par

## 2019-10-14 NOTE — ASSESSMENT
[FreeTextEntry1] : 72 y/o M presents to our office today for followup consultation for mediastinal lymphadenopathy referred to our office by Dr. Mims and is accompanied by his nieces. Radiologic images reviewed. Surgical options reviewed with patient and his nieces in accompaniment. The patient has a known biopsy proven right lower lobe lung cancer.  Complicating the picture is a fairly avid left hilar conglomeration of lymph nodes.  Careful examination of this conglomeration suggests that it is actually fairly confluent with the lung itself, such as level 11 and 12 lymph nodes.  There is no left lung mass.  There are additionally some faintly avid central mediastinal nodes.  Complicating this picture is that the patient has a personal past history of lymphoma.  \par \par He needs at this point full staging, to be done with pathologic evaluation of the areas of uptake on the PET.  I would approach this systematically with LEFT thoracoscopy and direct biopsy of the nodes here after careful dissection away from the PA and PV.  If those were negative on frozen, he would then undergo a cervical mediastinoscopy.  If that was negative, I would defer until a later date (to yield recovery from the left thoracoscopy) and plan for right sided resection.  If either of the sites were positive for a lung carcinoma, I would plan for mediport and to terminate the operation.  \par \par At present he will have full PFTs done with Dr. Mims and obtain cardiac clearance with Dr. Kirkpatrick. Tentatively, he will proceed to the OR on 10/7/19 for a Robotic left thoracoscopy, possible mediastinoscopy, possible Mediport with fluoroscopy. \par

## 2019-10-24 NOTE — H&P PST ADULT - REASON FOR ADMISSION
74 yo m here for past. pt sched for robotic left thoracoscopy, poss mediastinoscopy, poss mediport w/ fluoroscopy on 11/7/2019  denies any cp, palpitations,sob, fever, dysuria, uti.uri, no rec travels  exc ramiro 1 fos wo sob  + glasses  no hear def  no loose teeth/ + dent

## 2019-10-24 NOTE — H&P PST ADULT - NSICDXPASTMEDICALHX_GEN_ALL_CORE_FT
PAST MEDICAL HISTORY:  Cancer of lung     HTN (hypertension)     Hypothyroid     Lymphoma Small B-cell lymphoma, no treatment

## 2019-10-24 NOTE — H&P PST ADULT - NSICDXFAMILYHX_GEN_ALL_CORE_FT
FAMILY HISTORY:  Family history of oropharyngeal cancer, Sister  FH: throat cancer, Mother    Sibling  Still living? Yes, Estimated age: Age Unknown  Family history of breast cancer in sister, Age at diagnosis: Age Unknown

## 2019-10-24 NOTE — H&P PST ADULT - ATTENDING COMMENTS
General Thoracic Surgery Attestation    I have seen and examined the patient.  Where appropriate I have updated, edited, or corrected the resident's or PA's note with regard to findings, values, and plan.  There are no updates to the clinic H+P.    right lower lobe known cancer with left sided hilar and AP window adenopathy (more the former than the latter), plan for LEFT robotic thoracoscopy and assessment of nodes, if frozen negative or suggestive of lymphoma (px has history of same) plan for mediastinoscopy, if negative or lymphoma he would have been definitively staged and would need interval right sided procedure.  If frozens notes carcinoma, plan for port.  I have explained the risks and benefits of the procedure to him and he understands.  Plan for left robotic thoracoscopy and biopsy, possible open, possible mediastinoscopy, possible mediport.

## 2019-11-07 NOTE — PROGRESS NOTE ADULT - ASSESSMENT
s/p Left robotic thoracoscopy and biopsy, bronchoscopy, mediport (left subclavian)    Plan:  Advance diet to regular  Cont GI/ DVT prophylaxis.   Cont  OOB Ambulating  Incentive spirometry  Pain management  Chest tube to suction

## 2019-11-07 NOTE — BRIEF OPERATIVE NOTE - NSICDXBRIEFPROCEDURE_GEN_ALL_CORE_FT
PROCEDURES:  Thoracoscopy 07-Nov-2019 13:29:49 left robotic thoracoscopy and biopsy, bronchoscopy, mediport (left subclavian) Misbah Merrill

## 2019-11-07 NOTE — PROGRESS NOTE ADULT - SUBJECTIVE AND OBJECTIVE BOX
GENERAL SURGERY Post-op note    Procedure:   Left robotic thoracoscopy and biopsy, bronchoscopy, mediport (left subclavian)       Subjective:    74y  y/o Male post op day # 0 .  R59.9, R91.8, 24651  ^R59.9, R91.8, 63872  Family history of oropharyngeal cancer  FH: throat cancer  Family history of breast cancer in sister (Sibling)  Handoff  Cancer of lung  Hypothyroid  HTN (hypertension)  Lymphoma  Lung cancer  Lung cancer  Thoracoscopy  S/P lymph node biopsy  H/O thyroidectomy      Vital Signs Last 24 Hrs  T(C): 35.8 (07 Nov 2019 16:00), Max: 36.8 (07 Nov 2019 06:54)  T(F): 96.4 (07 Nov 2019 16:00), Max: 98.2 (07 Nov 2019 06:54)  HR: 70 (07 Nov 2019 16:00) (60 - 88)  BP: 134/60 (07 Nov 2019 16:00) (113/84 - 140/75)  BP(mean): --  RR: 18 (07 Nov 2019 16:00) (13 - 20)  SpO2: 98% (07 Nov 2019 15:03) (98% - 100%)    11-07 @ 07:01  -  11-07 @ 18:13  --------------------------------------------------------  IN: 160 mL / OUT: 15 mL / NET: 145 mL    chest tube: no airleak, minimal output        acetaminophen   Tablet .. 650 milliGRAM(s) Oral every 6 hours  ceFAZolin   IVPB 2000 milliGRAM(s) IV Intermittent every 8 hours  chlorhexidine 4% Liquid 1 Application(s) Topical <User Schedule>  dextrose 5% + sodium chloride 0.45%. 1000 milliLiter(s) IV Continuous <Continuous>  heparin  Injectable 5000 Unit(s) SubCutaneous every 8 hours  hydrochlorothiazide 25 milliGRAM(s) Oral daily  influenza   Vaccine 0.5 milliLiter(s) IntraMuscular once  levothyroxine 150 MICROGram(s) Oral daily  lisinopril 20 milliGRAM(s) Oral daily  metoprolol succinate  milliGRAM(s) Oral daily  morphine PCA (5 mG/mL) 30 milliLiter(s) PCA Continuous PCA Continuous  ondansetron Injectable 4 milliGRAM(s) IV Push every 6 hours PRN  pantoprazole    Tablet 40 milliGRAM(s) Oral before breakfast  senna 2 Tablet(s) Oral at bedtime    CXR:  Interval placement of left Mediport, distal tip in the SVC. Interval   placement of left apical chest tube.    Interval progression of bilateral hilar lymphadenopathy.        Physical Exam:  General: Alert & Oriented x 3.  NAD, resting comfortably in chair    Abdominal: Soft - Non tender   Lung: Bilateral breath sounds  Extremities: No swelling/ edema / erythema noted bilateral upper / lower extremities.    Assessment:74y Male Post OP Day #  S/P Left robotic thoracoscopy and biopsy, bronchoscopy, mediport (left subclavian) . Pt is hemodynamically stable.    Plan:  Advance diet to regular  Cont GI/ DVT prophylaxis.   Cont  OOB Ambulating  Incentive spirometry  Pain management

## 2019-11-07 NOTE — ASU PATIENT PROFILE, ADULT - PMH
Cancer of lung    HTN (hypertension)    Hypothyroid    Lymphoma  Small B-cell lymphoma, no treatment

## 2019-11-08 NOTE — PROGRESS NOTE ADULT - SUBJECTIVE AND OBJECTIVE BOX
GENERAL SURGERY PROGRESS NOTE     GABE EDEN  74y  Male  Hospital day :1d  POD:  Procedure: Thoracoscopy    OVERNIGHT EVENTS: no acute events overnight. Minimal chest tube     T(F): 97 (11-08-19 @ 07:25), Max: 97.8 (11-07-19 @ 15:03)  HR: 63 (11-08-19 @ 07:25) (60 - 88)  BP: 120/59 (11-08-19 @ 07:25) (113/84 - 140/75)  ABP: 133/59 (11-07-19 @ 14:03) (133/59 - 160/80)  ABP(mean): 86 (11-07-19 @ 14:03) (86 - 112)  RR: 18 (11-08-19 @ 07:45) (13 - 20)  SpO2: 94% (11-08-19 @ 07:45) (94% - 100%)     GI proph:  pantoprazole    Tablet 40 milliGRAM(s) Oral before breakfast    AC/ proph: heparin  Injectable 5000 Unit(s) SubCutaneous every 8 hours    PHYSICAL EXAM:  GENERAL: NAD, well-appearing  CHEST/LUNG: Clear to auscultation bilaterally  HEART: Regular rate and rhythm  ABDOMEN: Soft, Nontender, Nondistended;   EXTREMITIES:  No clubbing, cyanosis, or edema

## 2019-11-08 NOTE — DISCHARGE NOTE PROVIDER - NSDCCPCAREPLAN_GEN_ALL_CORE_FT
PRINCIPAL DISCHARGE DIAGNOSIS  Diagnosis: Lung cancer  Assessment and Plan of Treatment: s/p bronchoscopy, left robotic assisted thorascopic surgery with lymph node biopsy, and mediport placement.   Pain: Take Percocet as needed for pain.   Dressing: You may remove the dressing on 11/10. Do not shower while you have this dressing on. No submerging in water (baths, hot tubs, swimming) for 4 weeks after your surgery.   Diet: Continue your normal diet.   Activity: Continue to be active at home. Walk at least twice a day after discharge. No heavy lifting over 15 pounds for 6 weeks. Use your incentive spirometer every day (try to use it 10 times per hour) until your chest doesn't hurt and feels the same as it did before the surgery (around 4-6 weeks).   Driving: You may drive when you can perform the mechanics of driving without hesitation and when you're no longer taking narcotic pain medication.   Follow up: With Dr. Merrill in 1 week.

## 2019-11-08 NOTE — DISCHARGE NOTE PROVIDER - HOSPITAL COURSE
74M w/ PMH of small B cell lymphoma, hypothyroidism, hypertension, and lung cancer who presents for left robotic assisted thorascopic surgery with lymph node biopsy and mediport placement. Intraoperatively the AP window and hilar lymph nodes were biopsied and malignancy was confirmed. 1 chest tube was placed to suction. Post-operatively the pt was able to tolerate a regular diet, ambulate, void, and have pain controlled. The left chest tube was removed and no pneumothorax was observed. At the time of discharge, the patient was medically stable.

## 2019-11-08 NOTE — ANESTHESIA FOLLOW-UP NOTE - NSEVALATIONFT_GEN_ALL_CORE
Patient sitting out of bed in chair. Continues to use PCA pump for pain control.  Saturation in low 90's this morning

## 2019-11-08 NOTE — DISCHARGE NOTE PROVIDER - NSDCACTIVITY_GEN_ALL_CORE
No heavy lifting/straining/Walking - Outdoors allowed/Showering allowed/Stairs allowed/Walking - Indoors allowed

## 2019-11-08 NOTE — PROGRESS NOTE ADULT - ASSESSMENT
s/p Left robotic thoracoscopy and biopsy, bronchoscopy, mediport (left subclavian)    Plan:  chest tube to be removed  CXR after  possible discharge

## 2019-11-08 NOTE — DISCHARGE NOTE PROVIDER - CARE PROVIDERS DIRECT ADDRESSES
[email or web address]

## 2019-11-08 NOTE — DISCHARGE NOTE NURSING/CASE MANAGEMENT/SOCIAL WORK - PATIENT PORTAL LINK FT
You can access the FollowMyHealth Patient Portal offered by [facility] by registering at the following website: [email or web address]. By joining [employer]’s FollowMyHealth portal, you will also be able to view your health information using other applications (apps) compatible with our system.

## 2019-11-08 NOTE — DISCHARGE NOTE PROVIDER - NSDCFUADDINST_GEN_ALL_CORE_FT
Call Dr. Merrill's office if:   - You have trouble breathing.  - Your pain is uncontrolled.  - Your wound starts to bleed or looks infected.  - You develop a fever > 100.4 F.

## 2019-11-08 NOTE — DISCHARGE NOTE PROVIDER - CARE PROVIDER_API CALL
Misbah Merrill)  Surgery; Thoracic Surgery  [address] [place] [address], [address]  [place] [address]  Phone: [phone]  Fax: [phone]  Follow Up Time: 1 week

## 2019-11-08 NOTE — PROVIDER CONTACT NOTE (OTHER) - SITUATION
Patient is still currently on PCA Pump. Patient's chest tube was already taken out and patient is scheduled for discharge today

## 2019-11-08 NOTE — DISCHARGE NOTE PROVIDER - NSDCMRMEDTOKEN_GEN_ALL_CORE_FT
acetaminophen 325 mg oral tablet: 2 tab(s) orally every 6 hours  lisinopril-hydrochlorothiazide 20 mg-25 mg oral tablet: 1 tab(s) orally once a day  metoprolol succinate 100 mg oral tablet, extended release: 1 tab(s) orally once a day  omeprazole 40 mg oral delayed release capsule: 1 cap(s) orally once a day  Synthroid 150 mcg (0.15 mg) oral tablet: 1 tab(s) orally once a day lisinopril-hydrochlorothiazide 20 mg-25 mg oral tablet: 1 tab(s) orally once a day  metoprolol succinate 100 mg oral tablet, extended release: 1 tab(s) orally once a day  omeprazole 40 mg oral delayed release capsule: 1 cap(s) orally once a day  Percocet 5/325 oral tablet: 1 tab(s) orally every 6 hours, As Needed MDD:4   Synthroid 150 mcg (0.15 mg) oral tablet: 1 tab(s) orally once a day

## 2019-11-11 PROBLEM — C34.90 MALIGNANT NEOPLASM OF UNSPECIFIED PART OF UNSPECIFIED BRONCHUS OR LUNG: Chronic | Status: ACTIVE | Noted: 2019-01-01

## 2019-11-20 PROBLEM — Z98.890 S/P BIOPSY: Status: ACTIVE | Noted: 2019-01-01

## 2020-01-01 ENCOUNTER — INPATIENT (INPATIENT)
Facility: HOSPITAL | Age: 75
LOS: 4 days | Discharge: HOSPICE | End: 2020-08-05
Attending: INTERNAL MEDICINE | Admitting: INTERNAL MEDICINE
Payer: OTHER MISCELLANEOUS

## 2020-01-01 ENCOUNTER — TRANSCRIPTION ENCOUNTER (OUTPATIENT)
Age: 75
End: 2020-01-01

## 2020-01-01 ENCOUNTER — OUTPATIENT (OUTPATIENT)
Dept: OUTPATIENT SERVICES | Facility: HOSPITAL | Age: 75
LOS: 1 days | End: 2020-01-01

## 2020-01-01 VITALS — DIASTOLIC BLOOD PRESSURE: 78 MMHG | SYSTOLIC BLOOD PRESSURE: 160 MMHG | HEART RATE: 82 BPM

## 2020-01-01 VITALS
RESPIRATION RATE: 18 BRPM | HEART RATE: 92 BPM | OXYGEN SATURATION: 100 % | SYSTOLIC BLOOD PRESSURE: 118 MMHG | TEMPERATURE: 98 F | DIASTOLIC BLOOD PRESSURE: 78 MMHG

## 2020-01-01 DIAGNOSIS — Z98.890 OTHER SPECIFIED POSTPROCEDURAL STATES: Chronic | ICD-10-CM

## 2020-01-01 DIAGNOSIS — E89.0 POSTPROCEDURAL HYPOTHYROIDISM: Chronic | ICD-10-CM

## 2020-01-01 LAB
ALBUMIN SERPL ELPH-MCNC: 2.8 G/DL — LOW (ref 3.5–5.2)
ALP SERPL-CCNC: 98 U/L — SIGNIFICANT CHANGE UP (ref 30–115)
ALT FLD-CCNC: 6 U/L — SIGNIFICANT CHANGE UP (ref 0–41)
ANION GAP SERPL CALC-SCNC: 10 MMOL/L — SIGNIFICANT CHANGE UP (ref 7–14)
APPEARANCE UR: CLEAR — SIGNIFICANT CHANGE UP
APTT BLD: 29.7 SEC — SIGNIFICANT CHANGE UP (ref 27–39.2)
AST SERPL-CCNC: 12 U/L — SIGNIFICANT CHANGE UP (ref 0–41)
BACTERIA # UR AUTO: NEGATIVE — SIGNIFICANT CHANGE UP
BASOPHILS # BLD AUTO: 0.02 K/UL — SIGNIFICANT CHANGE UP (ref 0–0.2)
BASOPHILS NFR BLD AUTO: 0.2 % — SIGNIFICANT CHANGE UP (ref 0–1)
BILIRUB SERPL-MCNC: 0.8 MG/DL — SIGNIFICANT CHANGE UP (ref 0.2–1.2)
BILIRUB UR-MCNC: ABNORMAL
BUN SERPL-MCNC: 16 MG/DL — SIGNIFICANT CHANGE UP (ref 10–20)
CALCIUM SERPL-MCNC: 8 MG/DL — LOW (ref 8.5–10.1)
CHLORIDE SERPL-SCNC: 96 MMOL/L — LOW (ref 98–110)
CK SERPL-CCNC: 141 U/L — SIGNIFICANT CHANGE UP (ref 0–225)
CO2 SERPL-SCNC: 30 MMOL/L — SIGNIFICANT CHANGE UP (ref 17–32)
COLOR SPEC: YELLOW — SIGNIFICANT CHANGE UP
CREAT SERPL-MCNC: 0.9 MG/DL — SIGNIFICANT CHANGE UP (ref 0.7–1.5)
DIFF PNL FLD: NEGATIVE — SIGNIFICANT CHANGE UP
EOSINOPHIL # BLD AUTO: 0.07 K/UL — SIGNIFICANT CHANGE UP (ref 0–0.7)
EOSINOPHIL NFR BLD AUTO: 0.8 % — SIGNIFICANT CHANGE UP (ref 0–8)
EPI CELLS # UR: 1 /HPF — SIGNIFICANT CHANGE UP (ref 0–5)
GLUCOSE SERPL-MCNC: 107 MG/DL — HIGH (ref 70–99)
GLUCOSE UR QL: NEGATIVE — SIGNIFICANT CHANGE UP
HCT VFR BLD CALC: 28.2 % — LOW (ref 42–52)
HGB BLD-MCNC: 8.6 G/DL — LOW (ref 14–18)
HYALINE CASTS # UR AUTO: 6 /LPF — SIGNIFICANT CHANGE UP (ref 0–7)
IMM GRANULOCYTES NFR BLD AUTO: 0.6 % — HIGH (ref 0.1–0.3)
INR BLD: 1.28 RATIO — SIGNIFICANT CHANGE UP (ref 0.65–1.3)
KETONES UR-MCNC: ABNORMAL
LEUKOCYTE ESTERASE UR-ACNC: NEGATIVE — SIGNIFICANT CHANGE UP
LYMPHOCYTES # BLD AUTO: 0.49 K/UL — LOW (ref 1.2–3.4)
LYMPHOCYTES # BLD AUTO: 5.6 % — LOW (ref 20.5–51.1)
MCHC RBC-ENTMCNC: 26.3 PG — LOW (ref 27–31)
MCHC RBC-ENTMCNC: 30.5 G/DL — LOW (ref 32–37)
MCV RBC AUTO: 86.2 FL — SIGNIFICANT CHANGE UP (ref 80–94)
MONOCYTES # BLD AUTO: 0.4 K/UL — SIGNIFICANT CHANGE UP (ref 0.1–0.6)
MONOCYTES NFR BLD AUTO: 4.6 % — SIGNIFICANT CHANGE UP (ref 1.7–9.3)
NEUTROPHILS # BLD AUTO: 7.73 K/UL — HIGH (ref 1.4–6.5)
NEUTROPHILS NFR BLD AUTO: 88.2 % — HIGH (ref 42.2–75.2)
NITRITE UR-MCNC: NEGATIVE — SIGNIFICANT CHANGE UP
NRBC # BLD: 0 /100 WBCS — SIGNIFICANT CHANGE UP (ref 0–0)
PH UR: 6.5 — SIGNIFICANT CHANGE UP (ref 5–8)
PLATELET # BLD AUTO: 245 K/UL — SIGNIFICANT CHANGE UP (ref 130–400)
POTASSIUM SERPL-MCNC: 3.7 MMOL/L — SIGNIFICANT CHANGE UP (ref 3.5–5)
POTASSIUM SERPL-SCNC: 3.7 MMOL/L — SIGNIFICANT CHANGE UP (ref 3.5–5)
PROT SERPL-MCNC: 4.9 G/DL — LOW (ref 6–8)
PROT UR-MCNC: ABNORMAL
PROTHROM AB SERPL-ACNC: 14.7 SEC — HIGH (ref 9.95–12.87)
RBC # BLD: 3.27 M/UL — LOW (ref 4.7–6.1)
RBC # FLD: 17.1 % — HIGH (ref 11.5–14.5)
RBC CASTS # UR COMP ASSIST: 1 /HPF — SIGNIFICANT CHANGE UP (ref 0–4)
SARS-COV-2 RNA SPEC QL NAA+PROBE: SIGNIFICANT CHANGE UP
SODIUM SERPL-SCNC: 136 MMOL/L — SIGNIFICANT CHANGE UP (ref 135–146)
SP GR SPEC: 1.03 — HIGH (ref 1.01–1.02)
TROPONIN T SERPL-MCNC: <0.01 NG/ML — SIGNIFICANT CHANGE UP
UROBILINOGEN FLD QL: ABNORMAL
WBC # BLD: 8.76 K/UL — SIGNIFICANT CHANGE UP (ref 4.8–10.8)
WBC # FLD AUTO: 8.76 K/UL — SIGNIFICANT CHANGE UP (ref 4.8–10.8)
WBC UR QL: 2 /HPF — SIGNIFICANT CHANGE UP (ref 0–5)

## 2020-01-01 PROCEDURE — 99497 ADVNCD CARE PLAN 30 MIN: CPT | Mod: GV,25

## 2020-01-01 PROCEDURE — 99285 EMERGENCY DEPT VISIT HI MDM: CPT

## 2020-01-01 PROCEDURE — 71045 X-RAY EXAM CHEST 1 VIEW: CPT | Mod: 26

## 2020-01-01 PROCEDURE — 99231 SBSQ HOSP IP/OBS SF/LOW 25: CPT | Mod: GV

## 2020-01-01 PROCEDURE — 99239 HOSP IP/OBS DSCHRG MGMT >30: CPT | Mod: GV

## 2020-01-01 PROCEDURE — 93010 ELECTROCARDIOGRAM REPORT: CPT

## 2020-01-01 PROCEDURE — 99231 SBSQ HOSP IP/OBS SF/LOW 25: CPT

## 2020-01-01 PROCEDURE — 70450 CT HEAD/BRAIN W/O DYE: CPT | Mod: 26

## 2020-01-01 PROCEDURE — 99223 1ST HOSP IP/OBS HIGH 75: CPT

## 2020-01-01 RX ORDER — ACETAMINOPHEN 500 MG
2 TABLET ORAL
Qty: 0 | Refills: 0 | DISCHARGE
Start: 2020-01-01

## 2020-01-01 RX ORDER — MORPHINE SULFATE 50 MG/1
5 CAPSULE, EXTENDED RELEASE ORAL
Qty: 0 | Refills: 0 | DISCHARGE
Start: 2020-01-01

## 2020-01-01 RX ORDER — LISINOPRIL/HYDROCHLOROTHIAZIDE 10-12.5 MG
1 TABLET ORAL
Qty: 0 | Refills: 0 | DISCHARGE

## 2020-01-01 RX ORDER — KETOROLAC TROMETHAMINE 30 MG/ML
15 SYRINGE (ML) INJECTION EVERY 8 HOURS
Refills: 0 | Status: DISCONTINUED | OUTPATIENT
Start: 2020-01-01 | End: 2020-01-01

## 2020-01-01 RX ORDER — MORPHINE SULFATE 50 MG/1
10 CAPSULE, EXTENDED RELEASE ORAL EVERY 4 HOURS
Refills: 0 | Status: DISCONTINUED | OUTPATIENT
Start: 2020-01-01 | End: 2020-01-01

## 2020-01-01 RX ORDER — PANTOPRAZOLE SODIUM 20 MG/1
40 TABLET, DELAYED RELEASE ORAL
Refills: 0 | Status: DISCONTINUED | OUTPATIENT
Start: 2020-01-01 | End: 2020-01-01

## 2020-01-01 RX ORDER — ENOXAPARIN SODIUM 100 MG/ML
40 INJECTION SUBCUTANEOUS DAILY
Refills: 0 | Status: DISCONTINUED | OUTPATIENT
Start: 2020-01-01 | End: 2020-01-01

## 2020-01-01 RX ORDER — CEPHALEXIN 500 MG
1 CAPSULE ORAL
Qty: 6 | Refills: 0
Start: 2020-01-01 | End: 2020-01-01

## 2020-01-01 RX ORDER — NYSTATIN 500MM UNIT
5 POWDER (EA) MISCELLANEOUS
Qty: 0 | Refills: 0 | DISCHARGE
Start: 2020-01-01

## 2020-01-01 RX ORDER — CHLORHEXIDINE GLUCONATE 213 G/1000ML
1 SOLUTION TOPICAL
Refills: 0 | Status: DISCONTINUED | OUTPATIENT
Start: 2020-01-01 | End: 2020-01-01

## 2020-01-01 RX ORDER — OMEPRAZOLE 10 MG/1
1 CAPSULE, DELAYED RELEASE ORAL
Qty: 0 | Refills: 0 | DISCHARGE

## 2020-01-01 RX ORDER — NYSTATIN 500MM UNIT
500000 POWDER (EA) MISCELLANEOUS EVERY 6 HOURS
Refills: 0 | Status: DISCONTINUED | OUTPATIENT
Start: 2020-01-01 | End: 2020-01-01

## 2020-01-01 RX ORDER — LABETALOL HCL 100 MG
10 TABLET ORAL ONCE
Refills: 0 | Status: COMPLETED | OUTPATIENT
Start: 2020-01-01 | End: 2020-01-01

## 2020-01-01 RX ORDER — ACETAMINOPHEN 500 MG
650 TABLET ORAL EVERY 6 HOURS
Refills: 0 | Status: DISCONTINUED | OUTPATIENT
Start: 2020-01-01 | End: 2020-01-01

## 2020-01-01 RX ORDER — LEVOTHYROXINE SODIUM 125 MCG
150 TABLET ORAL DAILY
Refills: 0 | Status: DISCONTINUED | OUTPATIENT
Start: 2020-01-01 | End: 2020-01-01

## 2020-01-01 RX ADMIN — Medication 500000 UNIT(S): at 23:37

## 2020-01-01 RX ADMIN — Medication 500000 UNIT(S): at 05:11

## 2020-01-01 RX ADMIN — ENOXAPARIN SODIUM 40 MILLIGRAM(S): 100 INJECTION SUBCUTANEOUS at 11:28

## 2020-01-01 RX ADMIN — Medication 500000 UNIT(S): at 05:24

## 2020-01-01 RX ADMIN — CHLORHEXIDINE GLUCONATE 1 APPLICATION(S): 213 SOLUTION TOPICAL at 15:36

## 2020-01-01 RX ADMIN — ENOXAPARIN SODIUM 40 MILLIGRAM(S): 100 INJECTION SUBCUTANEOUS at 11:15

## 2020-01-01 RX ADMIN — CHLORHEXIDINE GLUCONATE 1 APPLICATION(S): 213 SOLUTION TOPICAL at 05:11

## 2020-01-01 RX ADMIN — Medication 500000 UNIT(S): at 11:29

## 2020-01-01 RX ADMIN — PANTOPRAZOLE SODIUM 40 MILLIGRAM(S): 20 TABLET, DELAYED RELEASE ORAL at 05:24

## 2020-01-01 RX ADMIN — Medication 500000 UNIT(S): at 11:15

## 2020-01-01 RX ADMIN — Medication 500000 UNIT(S): at 17:58

## 2020-01-01 RX ADMIN — ENOXAPARIN SODIUM 40 MILLIGRAM(S): 100 INJECTION SUBCUTANEOUS at 11:02

## 2020-01-01 RX ADMIN — Medication 150 MICROGRAM(S): at 05:24

## 2020-01-01 RX ADMIN — PANTOPRAZOLE SODIUM 40 MILLIGRAM(S): 20 TABLET, DELAYED RELEASE ORAL at 05:59

## 2020-01-01 RX ADMIN — Medication 10 MILLIGRAM(S): at 08:45

## 2020-01-01 RX ADMIN — MORPHINE SULFATE 10 MILLIGRAM(S): 50 CAPSULE, EXTENDED RELEASE ORAL at 16:44

## 2020-01-01 RX ADMIN — CHLORHEXIDINE GLUCONATE 1 APPLICATION(S): 213 SOLUTION TOPICAL at 05:24

## 2020-01-01 RX ADMIN — CHLORHEXIDINE GLUCONATE 1 APPLICATION(S): 213 SOLUTION TOPICAL at 05:59

## 2020-01-01 RX ADMIN — Medication 500000 UNIT(S): at 21:19

## 2020-01-01 RX ADMIN — Medication 500000 UNIT(S): at 11:02

## 2020-01-01 RX ADMIN — Medication 150 MICROGRAM(S): at 05:59

## 2020-01-01 RX ADMIN — PANTOPRAZOLE SODIUM 40 MILLIGRAM(S): 20 TABLET, DELAYED RELEASE ORAL at 05:11

## 2020-01-01 RX ADMIN — Medication 500000 UNIT(S): at 21:21

## 2020-01-01 RX ADMIN — Medication 500000 UNIT(S): at 00:34

## 2020-01-01 RX ADMIN — Medication 500000 UNIT(S): at 17:08

## 2020-01-01 RX ADMIN — Medication 500000 UNIT(S): at 17:06

## 2020-01-01 RX ADMIN — Medication 500000 UNIT(S): at 17:24

## 2020-01-01 RX ADMIN — Medication 150 MICROGRAM(S): at 15:36

## 2020-01-01 RX ADMIN — PANTOPRAZOLE SODIUM 40 MILLIGRAM(S): 20 TABLET, DELAYED RELEASE ORAL at 07:46

## 2020-01-01 RX ADMIN — Medication 500000 UNIT(S): at 05:59

## 2020-01-01 RX ADMIN — ENOXAPARIN SODIUM 40 MILLIGRAM(S): 100 INJECTION SUBCUTANEOUS at 11:01

## 2020-01-01 RX ADMIN — Medication 150 MICROGRAM(S): at 05:11

## 2020-01-01 RX ADMIN — Medication 500000 UNIT(S): at 11:01

## 2020-07-31 NOTE — ED PROVIDER NOTE - CLINICAL SUMMARY MEDICAL DECISION MAKING FREE TEXT BOX
d/w family, no intervention for CXR finding. discussed w/ hospice coordinator Isidoro and family- admit to medicine for respite, discharge planning (family endorsed interest in [facility]. )

## 2020-07-31 NOTE — ED PROVIDER NOTE - PROGRESS NOTE DETAILS
pt w/ metastatic lung ca on hospice here for unwitnessed fall w/ change in mental status. neuro exam w/o focal deficits, however, pupils symmetrically dilated and sluggish. possible intracranial bleed/mass, possibly related to pain medication, r/o lyte abnormality, less likely acs/arrythmia but pt unreliable, r/o infection- uti/pna

## 2020-07-31 NOTE — ED ADULT NURSE NOTE - NSIMPLEMENTINTERV_GEN_ALL_ED
Implemented All Fall with Harm Risk Interventions:  [place] to call system. Call bell, personal items and telephone within reach. Instruct patient to call for assistance. Room bathroom lighting operational. Non-slip footwear when patient is off stretcher. Physically safe environment: no spills, clutter or unnecessary equipment. Stretcher in lowest position, wheels locked, appropriate side rails in place. Provide visual cue, wrist band, yellow gown, etc. Monitor gait and stability. Monitor for mental status changes and reorient to person, place, and time. Review medications for side effects contributing to fall risk. Reinforce activity limits and safety measures with patient and family. Provide visual clues: red socks.

## 2020-07-31 NOTE — H&P ADULT - ATTENDING COMMENTS
**Hx and physical severely limited due to pt being poor historian and unable to reach family. Supplemental information obtained from house staff and EMR.     73 YO M with a PMH of metastatic lung CA (comfort measures only), small B cell lymphoma, GERD, and hypothyroid who was sent to the hospital for eval of changing mental status. Associated with generalized weakness and SOB over the past several months. Pt currently is hospice and it seems like the family is leaning towards in-pt hospice care. Of note, the pt also fell a couple days ago but the family did not send him to the hospital at that time. In the ED, Chest X-Ray showed right-sided pleural effusion. CTH was negative.     Physical exam shows cachetic pt in NAD. VSS, afebrile, not hypoxic on 2L NC. A&Ox2 (Name and place). Non-focal neuro exam. Muscle strength/sensation intact. Decreased breath sounds on the right with dullness to percussion. RRR, no M/G/R. ABD is soft and non-tender, normoactive BSs. LEs without swelling. No rashes. Labs and radiology as above.    Generalized weakness + SOB due to progression of known metastatic lung Cancer. Pt is currently in hospice. Family would like palliative consult for possible in-pt care. Supplemental O2 and pain meds PRN.     Normocytic anemia, below baseline. Pt denies any bleeding symptoms. Send anemia work-up. Repeat CBC in the AM.     Hypoalbuminemia from poor oral intake. Nutrition eval.     Fall? As per the chart. Pt denies any pain currently. CTH negative.     Hx of metastatic lung CA (comfort measures only), small B cell lymphoma, GERD, and hypothyroid. Restart home meds. DVT PPX. Inform PCP of pt's admission to hospital. My note supersedes the residents note.

## 2020-07-31 NOTE — ED PROVIDER NOTE - PHYSICAL EXAMINATION
PHYSICAL EXAM:    Constitutional: awake, alert, NAD  Eyes: EOMI, no conj injection  HENT: NC AT  Back: no c/t/l spine ttp, no abrasions or rashes to back  Respiratory: no respiratory distress, breath sounds equal b/l, no wheezing, rhonchi or stridor.   Cardiovascular: RRR nml S1S2  Gastrointestinal: soft, no masses, nontender, nondistended. No guarding or rebound.   Extremities: no peripheral edema  Neurological: AAOx3, CN II-XII grossly intact, no focal numbness or weakness, pupils 6mm b/l, sluggish, speech slow but oriented to person/place/time  Skin: no rash  Musculoskeletal: no gross deformity, pelvis stable

## 2020-07-31 NOTE — H&P ADULT - ASSESSMENT
73 yo m hx metastatic lung ca comfort measures only, small B cell lymphoma, gerd, hypothyroid admitted for change in mental status and placement.    # Fall  - Unwitnessed  - CTH showed no acute intracranial pathologies  - PT/rehab  - symptomatic treatment    # Metastatic lung Cancer  - Comfort measures only, followed by hospice at home  - Family interested in hospice in facility  - Right large pleural effusion, no intervention as per ED note  - Confirm with family GOC in AM  - Rehab  - symptomatic treatment    # Small B cell lymphoma  - No treatment    # GERD  - pantoprazole    # Hypothyroidism  - Confirm with daughter in AM for levothyroxine dose    # DVT ppx: lovenox  # PPI ppx: pantoprazole  # DIET: DASH  # ACTIVITY: PT  # DISPO: from home, physiatry c/s  Med rec to be done with AM when daughter can be reached 73 yo m hx metastatic lung ca comfort measures only, small B cell lymphoma, gerd, hypothyroid admitted for change in mental status and placement.    # Fall  - Unwitnessed  - CTH showed no acute intracranial pathologies  - PT/rehab  - symptomatic treatment    # Metastatic lung Cancer  - Comfort measures only, followed by hospice at home  - Family interested in hospice in facility  - Right large pleural effusion, no intervention as per ED note  - Confirm with family GOC in AM  - Rehab  - symptomatic treatment    # Small B cell lymphoma  - No treatment    # GERD  - pantoprazole    # Hypothyroidism  - Confirm with daughter in AM for levothyroxine dose    # DVT ppx: lovenox  # PPI ppx: pantoprazole  # DIET: DASH  # ACTIVITY: IAT, PT consult  # DISPO: from home, physiatry c/s  Med rec to be done with AM when daughter can be reached

## 2020-07-31 NOTE — H&P ADULT - NSHPLABSRESULTS_GEN_ALL_CORE
8.6    8.76  )-----------( 245      ( 31 Jul 2020 13:58 )             28.2     07-31    136  |  96<L>  |  16  ----------------------------<  107<H>  3.7   |  30  |  0.9    Ca    8.0<L>      31 Jul 2020 13:58    TPro  4.9<L>  /  Alb  2.8<L>  /  TBili  0.8  /  DBili  x   /  AST  12  /  ALT  6   /  AlkPhos  98  07-31    < from: CT Head No Cont (07.31.20 @ 14:30) >    IMPRESSION:  No acute intracranial pathology. No evidence of midline shift, mass effect or intracranial hemorrhage.    < end of copied text >    < from: Xray Chest 1 View-PORTABLE IMMEDIATE (07.31.20 @ 14:53) >      Impression:    Large right pleural effusion with diffuse opacification throughout right lower and midlung fields; may represent worsening lung mass; consider evaluation with cross-sectional imaging.      < end of copied text >

## 2020-07-31 NOTE — H&P ADULT - HISTORY OF PRESENT ILLNESS
73 yo m hx metastatic lung ca comfort measures only, small B cell lymphoma, gerd, hypothyroid admitted for change in mental status and placement. pt is a poor historian and confused. Multiple attempts to reach the daughter over the phone for further history with no answer. History obtained partially from the patient and from the ED note. He mentioned that he fell today in the bathroom with no head injury. He does not remember why he fell and was found on the ground by the visiting nurse, the fall was unwitnessed. No LOC, nausea, vomiting but family stated that he has been off baseline post fall. He appeared confused and had a change in his speech pattern. Patient is being followed by hospice at home and family decided to pursue hospice in facility as per ED note, admitted for placement.    CTH done in ED showed no acute intracranial pathologies.  CXR showed large right pleural effusion with diffuse opacification throughout right lower and midlung fields. ED attending spoke with the family and decided for no further interventions.   Med rec to be done in AM.

## 2020-07-31 NOTE — ED PROVIDER NOTE - OBJECTIVE STATEMENT
75 yo m hx metastatic lung ca, gerd, hypothyroid here for change in mental status. pt feel several days ago but family states there was no injury. family states pt had a fall today and since then has been off baseline. per family, pt is "off" from baseline, has a change in speech pattern and appears somewhat confused. fall unwitnessed. visiting nurse found pt on the ground today on ground. pt does not know how/why he fell. family states bp has been a little low recently. pt w/ chronic cough. no urinary complaints, fever or chills. pt denies ha or complaints. no cp, sob.  pt is on hospice. family would like basic workup, cth and then will decide goals/treatment pending result

## 2020-07-31 NOTE — ED ADULT TRIAGE NOTE - CHIEF COMPLAINT QUOTE
S/p fall due to weakness. Denies any injury. No head trauma. Not on anticoagulation. GCS 15. Pt also fell a few days ago.

## 2020-08-01 NOTE — CONSULT NOTE ADULT - ASSESSMENT
IMPRESSION: Rehabilitation for deconditioning    PRECAUTIONS: [  ] Cardiac  [  ] Respiratory  [  ] Seizures [  ] Contact Precautions  [  ] Droplet Isolation  [  ] Other:      Weight Bearing Status:  WBAT    RECOMMENDATION:    Out of bed to chair     DVT/decubitus ulcer prophylaxis    REHABILITATION PLAN:     [ X  ] Bedside PT 3-5 times a week   [   ]   Bedside OT  2-3 times a week             [   ] No Rehab Therapy Indicated                   [   ]  Speech Therapy   Conditioning/ROM                                    ADL  Bed Mobility                                               Conditioning/ROM  Transfers                                                     Bed Mobility  Sitting /Standing Balance                         Transfers                                        Gait Training                                               Sitting/Standing Balance  Stair Training  [   ] Applicable                    Home Equipment Evaluation                                                                        Splinting  [   ] Only      GOALS:   ADL   [   ]   Independent                    Transfers  [   ] Independent                          Ambulation  [   ] Independent     [  X  ] With device                            [   ]  CG                                                         [   ]  CG                                                                  [   ] CG                            [    ] Min A                                                   [ X  ] Min A                                                              [  X ] Min  A          DISCHARGE PLAN:  [    ]  Good candidate for Intensive Rehabilitation/Hospital-based 4A [facility]                                             Will tolerate 3 hours of Intensive Rehab Daily                                       [  X  ]  Short Term Rehabilitation in Skilled Nursing Facility                                       [  X  ]  Home with Outpatient or VN services                                         [    ]  Possible Candidate for Intensive Hospital-Based Rehabilitation      Thank you. IMPRESSION: Rehabilitation for deconditioning, gait disorder    PRECAUTIONS: [  ] Cardiac  [  ] Respiratory  [  ] Seizures [  ] Contact Precautions  [  ] Droplet Isolation  [  ] Other:      Weight Bearing Status:  WBAT    RECOMMENDATION:    Out of bed to chair     DVT/decubitus ulcer prophylaxis    REHABILITATION PLAN:     [ X  ] Bedside PT 3-5 times a week   [   ]   Bedside OT  2-3 times a week             [   ] No Rehab Therapy Indicated                   [   ]  Speech Therapy   Conditioning/ROM                                    ADL  Bed Mobility                                               Conditioning/ROM  Transfers                                                     Bed Mobility  Sitting /Standing Balance                         Transfers                                        Gait Training                                               Sitting/Standing Balance  Stair Training  [   ] Applicable                    Home Equipment Evaluation                                                                        Splinting  [   ] Only      GOALS:   ADL   [   ]   Independent                    Transfers  [   ] Independent                          Ambulation  [   ] Independent     [  X  ] With device                            [   ]  CG                                                         [   ]  CG                                                                  [   ] CG                            [    ] Min A                                                   [ X  ] Min A                                                              [  X ] Min  A          DISCHARGE PLAN:  [    ]  Good candidate for Intensive Rehabilitation/Hospital-based 4A [facility]                                             Will tolerate 3 hours of Intensive Rehab Daily                                       [  X  ]  Short Term Rehabilitation in Skilled Nursing Facility                                       [  X  ]  Home with Outpatient or  services                                         [    ]  Possible Candidate for Intensive Hospital-Based Rehabilitation      Thank you.

## 2020-08-01 NOTE — GOALS OF CARE CONVERSATION - ADVANCED CARE PLANNING - CONVERSATION DETAILS
The pt comes to the hospital with a MOLST that was completed on 5/7/20 by Daisy Trinh (Niece; HCP). He is DNR/DNI

## 2020-08-01 NOTE — CONSULT NOTE ADULT - SUBJECTIVE AND OBJECTIVE BOX
HPI:  74 y.o. m hx metastatic lung ca comfort measures only, small B cell lymphoma, gerd, hypothyroid admitted for change in mental status and placement. pt is a poor historian and confused. Multiple attempts to reach the daughter over the phone for further history with no answer. History obtained partially from the patient and from the ED note. He mentioned that he fell today in the bathroom with no head injury. He does not remember why he fell and was found on the ground by the visiting nurse, the fall was unwitnessed. No LOC, nausea, vomiting but family stated that he has been off baseline post fall. He appeared confused and had a change in his speech pattern. Patient is being followed by hospice at home and family decided to pursue hospice in facility as per ED note, admitted for placement.    CTH done in ED showed no acute intracranial pathologies.  CXR showed large right pleural effusion with diffuse opacification throughout right lower and midlung fields. ED attending spoke with the family and decided for no further interventions.   Med rec to be done in AM. (2020 22:59)      PAST MEDICAL & SURGICAL HISTORY:  Cancer of lung  Hypothyroid  HTN (hypertension)  Lymphoma: Small B-cell lymphoma, no treatment  S/P lymph node biopsy  H/O thyroidectomy      HOSPITAL COURSE:    TODAY'S SUBJECTIVE & REVIEW OF SYMPTOMS:    [confused]      MEDICATIONS  (STANDING):  chlorhexidine 4% Liquid 1 Application(s) Topical <User Schedule>  enoxaparin Injectable 40 milliGRAM(s) SubCutaneous daily  levothyroxine 150 MICROGram(s) Oral daily  nystatin    Suspension 751900 Unit(s) Oral every 6 hours  pantoprazole    Tablet 40 milliGRAM(s) Oral before breakfast    MEDICATIONS  (PRN):  acetaminophen   Tablet .. 650 milliGRAM(s) Oral every 6 hours PRN Moderate Pain (4 - 6)      FAMILY HISTORY:  Family history of oropharyngeal cancer: Sister  FH: throat cancer: Mother  Family history of breast cancer in sister (Sibling)      Allergies    erythromycin (Rash)  Sulfa (Rash)    Intolerances    IV Contrast (Flushing; Rash)      SOCIAL HISTORY:    [  ] Smoking  [  ] EtOH  [  ] Substance abuse     Home Environment:  [  ] Alone  [  ] Lives with Family  [  ] Home Health Aide    Dwelling:  [  ] Private House  [  ] Apartment  [  ] Adult Home/Assisted Living Facility  [  ] Skilled Nursing Facility      [  ] Short Term  [  ] Long Term  [  ] Stairs       Elevator [  ]    FUNCTIONAL STATUS PTA: (Check all that apply)  Ambulation: [   ]Independent    [  ] Dependent     [  ] Non-Ambulatory  Assistive Device: [  ] Straight Cane  [  ]  Quad Cane  [  ] Walker  [  ]  Wheelchair  ADL: [  ] Independent  [  ]  Dependent       Vital Signs Last 24 Hrs  T(C): 35.6 (01 Aug 2020 13:02), Max: 35.9 (01 Aug 2020 05:01)  T(F): 96 (01 Aug 2020 13:02), Max: 96.7 (01 Aug 2020 05:01)  HR: 99 (01 Aug 2020 13:02) (89 - 99)  BP: 115/59 (01 Aug 2020 13:02) (104/57 - 133/83)  BP(mean): --  RR: 18 (01 Aug 2020 13:) (18 - 20)  SpO2: 98% (01 Aug 2020 13:) (97% - 99%)      PHYSICAL EXAM: Alert, confused  GENERAL: Well-developed, well-nourished, in NAD  HEAD:  Normocephalic, atraumatic  EYES: EOMI, PERRLA, sclerae anicteric, conjunctivae not injected  NECK: Supple, No JVD, Normal thyroid  CHEST/LUNG: Clear to auscultation bilaterally; No rales, rhonchi, wheezing  HEART: Regular rate and rhythm; No murmurs, gallops, or rubs  ABDOMEN: Soft, nontender, nondistended; Bowel sounds present  EXTREMITIES:   intrinsic muscle atrophy bilateral hands, 1+ BLE pedal edema    NERVOUS SYSTEM:  Cranial Nerves II-XII intact [X  ] Abnormal  [  ]  ROM: WFL all extremities [ X ]  Abnormal [  ]  Motor Strength: WFL all extremities  [  ]  Abnormal [X  ]  Sensation: intact to light touch [  ] Abnormal [  ]  Reflexes: Symmetric [  ]  Abnormal [  ]    FUNCTIONAL STATUS:  Bed Mobility: Independent [  ]  Supervision [  ]  Needs Assistance [X  ]  N/A [  ]  Transfers: Independent [  ]  Supervision [  ]  Needs Assistance [ X ]  N/A [  ]   Ambulation: Independent [  ]  Supervision [  ]  Needs Assistance [x  ]  N/A [  ]  ADLs: Independent [  ] Requires Assistance [ X ] N/A [  ]      LABS:                        8.6    8.76  )-----------( 245      ( 2020 13:58 )             28.2     07-31    136  |  96<L>  |  16  ----------------------------<  107<H>  3.7   |  30  |  0.9    Ca    8.0<L>      2020 13:58    TPro  4.9<L>  /  Alb  2.8<L>  /  TBili  0.8  /  DBili  x   /  AST  12  /  ALT  6   /  AlkPhos  98  07-31    PT/INR - ( 2020 13:58 )   PT: 14.70 sec;   INR: 1.28 ratio         PTT - ( 2020 13:58 )  PTT:29.7 sec  Urinalysis Basic - ( 2020 11:24 )    Color: Yellow / Appearance: Clear / S.030 / pH: x  Gluc: x / Ketone: Small  / Bili: Small / Urobili: 3 mg/dL   Blood: x / Protein: 30 mg/dL / Nitrite: Negative   Leuk Esterase: Negative / RBC: 1 /HPF / WBC 2 /HPF   Sq Epi: x / Non Sq Epi: 1 /HPF / Bacteria: Negative        RADIOLOGY & ADDITIONAL STUDIES:    EXAM:  XR CHEST PORTABLE IMMED 1V            PROCEDURE DATE:  2020            INTERPRETATION:  Clinical History / Reason for exam: Weakness. Lung cancer. Lymphoma.    Comparison : Chest radiograph 2019.    Technique/Positioning: Single frontal chest x-ray obtained.    Findings:    Support devices: Stable left chest port catheter.    Cardiac/mediastinum/hilum: Obscured.    Lung parenchyma/Pleura: Large right pleural effusion with diffuse opacification throughout right lower and midlung fields; may represent worsening lung mass; consider evaluation with cross-sectional imaging.    Skeleton/soft tissues: Unchanged.    Impression:    Large right pleural effusion with diffuse opacification throughout right lower and midlung fields; may represent worsening lung mass; consider evaluation with cross-sectional imaging.        EXAM:  CT BRAIN            PROCEDURE DATE:  2020            INTERPRETATION:  CLINICAL INDICATION: Status post fall, change in mental status.    Technique: CT of the head was performed without contrast.    Multiple contiguous axial images wereacquired from the skullbase to the vertex without the administration of intravenous contrast.  Coronal and sagittal reformations were made.    COMPARISON: MR brain dated 10/3/2019    FINDINGS:    The ventricles and sulci are unremarkable in appearance.     There is no intraparenchymal hematoma, mass effect or midline shift. No abnormal extra-axial fluid collections are present.    The calvarium is intact. The visualized intraorbital compartments, paranasal sinuses and mastoid complexes appear free of acute disease.    IMPRESSION:  No acute intracranial pathology. No evidence of midline shift, mass effect or intracranial hemorrhage.

## 2020-08-01 NOTE — PROGRESS NOTE ADULT - SUBJECTIVE AND OBJECTIVE BOX
GABE EDEN 74y Male  MRN#: [id number]   CODE STATUS: DNR/DNI      SUBJECTIVE  Patient is a 74y old Male who presents with a chief complaint of confusion (2020 22:59)  Currently admitted to medicine with the primary diagnosis of Weakness  Today is hospital day 1d, and this morning he is lying in bed complaining of weakness. He seems to be confused, and has trouble remembering short term events. For example, he forgot that he gave his hearing aids to his niece and thought they were lost. This is not his baseline mental status. Usually he is alert and orientated and lived independently with his sister who is 82.     Present Today:           Bai Catheter (x)No/ ()Yes? Indication:          Central Line (x)No/ ()Yes? Indication:          IV Fluids (x)No/ ()Yes? Type:  Rate:  Indication:      OBJECTIVE  PAST MEDICAL & SURGICAL HISTORY  Cancer of lung  Hypothyroid  HTN (hypertension)  Lymphoma: Small B-cell lymphoma, no treatment  S/P lymph node biopsy  H/O thyroidectomy    ALLERGIES:  erythromycin (Rash)  Sulfa (Rash)    MEDICATIONS:  STANDING MEDICATIONS  enoxaparin Injectable 40 milliGRAM(s) SubCutaneous daily  pantoprazole    Tablet 40 milliGRAM(s) Oral before breakfast    PRN MEDICATIONS  acetaminophen   Tablet .. 650 milliGRAM(s) Oral every 6 hours PRN      VITAL SIGNS: Last 24 Hours  T(C): 35.9 (01 Aug 2020 05:01), Max: 36.6 (2020 13:00)  T(F): 96.7 (01 Aug 2020 05:01), Max: 97.9 (2020 13:00)  HR: 89 (01 Aug 2020 05:01) (89 - 105)  BP: 133/83 (01 Aug 2020 05:01) (104/57 - 133/83)  BP(mean): --  RR: 18 (01 Aug 2020 08:59) (18 - 20)  SpO2: 97% (01 Aug 2020 08:59) (97% - 100%)    LABS:                        8.6    8.76  )-----------( 245      ( 2020 13:58 )             28.2     07-31    136  |  96<L>  |  16  ----------------------------<  107<H>  3.7   |  30  |  0.9    Ca    8.0<L>      2020 13:58    TPro  4.9<L>  /  Alb  2.8<L>  /  TBili  0.8  /  DBili  x   /  AST  12  /  ALT  6   /  AlkPhos  98  07-31    PT/INR - ( 2020 13:58 )   PT: 14.70 sec;   INR: 1.28 ratio       PTT - ( 2020 13:58 )  PTT:29.7 sec  Urinalysis Basic - ( 2020 11:24 )    Color: Yellow / Appearance: Clear / S.030 / pH: x  Gluc: x / Ketone: Small  / Bili: Small / Urobili: 3 mg/dL   Blood: x / Protein: 30 mg/dL / Nitrite: Negative   Leuk Esterase: Negative / RBC: 1 /HPF / WBC 2 /HPF   Sq Epi: x / Non Sq Epi: 1 /HPF / Bacteria: Negative    Creatine Kinase, Serum: 141 U/L (20 @ 15:40)  Troponin T, Serum: <0.01 ng/mL (20 @ 13:58)    CARDIAC MARKERS ( 2020 15:40 )  x     / x     / 141 U/L / x     / x      CARDIAC MARKERS ( 2020 13:58 )  x     / <0.01 ng/mL / x     / x     / x          RADIOLOGY:  < from: CT Head No Cont (20 @ 14:30) >  IMPRESSION:  No acute intracranial pathology. No evidence of midline shift, mass effect or intracranial hemorrhage.  < end of copied text >    PHYSICAL EXAM:  GENERAL: NAD, well-developed, AAOx2  HEENT:  Atraumatic, Normocephalic. conjunctiva and sclera clear, No JVD  PULMONARY: Clear to auscultation bilaterally; No wheeze  CARDIOVASCULAR: Regular rate and rhythm; No murmurs, rubs, or gallops  GASTROINTESTINAL: Soft, Nontender, Nondistended; Bowel sounds present  MUSCULOSKELETAL: chronic venous insufficiency, muscle strength 4/5 LE  NEUROLOGY: non-focal  SKIN: No rashes or lesions      ADMISSION SUMMARY  75 yo m hx metastatic lung ca comfort measures only, small B cell lymphoma, gerd, hypothyroid admitted for change in mental status and placement. pt is a poor historian and confused. Multiple attempts to reach the daughter over the phone for further history with no answer. History obtained partially from the patient and from the ED note. He mentioned that he fell today in the bathroom with no head injury. He does not remember why he fell and was found on the ground by the visiting nurse, the fall was unwitnessed. No LOC, nausea, vomiting but family stated that he has been off baseline post fall. He appeared confused and had a change in his speech pattern. Patient is being followed by hospice at home and family decided to pursue hospice in facility and is admitted for placement.    CTH done in ED showed no acute intracranial pathologies.  CXR showed large right pleural effusion with diffuse opacification throughout right lower and midlung fields. ED attending spoke with the family and decided for no further interventions.     ASSESSMENT & PLAN  75 yo m hx metastatic lung ca comfort measures only, small B cell lymphoma, gerd, hypothyroid admitted for change in mental status and placement.    #Unwitnessed Fall  - CTH showed no acute intracranial pathologies  - PT/rehab  - symptomatic treatment (morphine 2mg PRN)    # Oral Thrush   - Nystatin - swish and swallow     #Hypoalbuminemia 2/2 poor oral intake  - nutrition consult    # Metastatic lung Cancer  - Comfort measures only, followed by hospice at home  - Family interested in hospice in facility  - Rehab  - symptomatic treatment    # Small B cell lymphoma  - No treatment    # GERD  - pantoprazole    # Hypothyroidism  - synthroid 125    # DVT ppx: lovenox  # PPI ppx: pantoprazole  # DIET: DASH  # ACTIVITY: IAT, PT consult GABE EDEN 74y Male  MRN#: [id number]   CODE STATUS: DNR/DNI      SUBJECTIVE  Patient is a 74y old Male who presents with a chief complaint of confusion (2020 22:59)  Currently admitted to medicine with the primary diagnosis of Weakness  Today is hospital day 1d, and this morning he is lying in bed complaining of weakness. He seems to be confused, and has trouble remembering short term events. For example, he forgot that he gave his hearing aids to his niece and thought they were lost. This is not his baseline mental status. Usually he is alert and orientated and lived independently with his sister who is 82.     Present Today:           Bai Catheter (x)No/ ()Yes? Indication:          Central Line (x)No/ ()Yes? Indication:          IV Fluids (x)No/ ()Yes? Type:  Rate:  Indication:      OBJECTIVE  PAST MEDICAL & SURGICAL HISTORY  Cancer of lung  Hypothyroid  HTN (hypertension)  Lymphoma: Small B-cell lymphoma, no treatment  S/P lymph node biopsy  H/O thyroidectomy    ALLERGIES:  erythromycin (Rash)  Sulfa (Rash)    MEDICATIONS:  STANDING MEDICATIONS  enoxaparin Injectable 40 milliGRAM(s) SubCutaneous daily  pantoprazole    Tablet 40 milliGRAM(s) Oral before breakfast    PRN MEDICATIONS  acetaminophen   Tablet .. 650 milliGRAM(s) Oral every 6 hours PRN      VITAL SIGNS: Last 24 Hours  T(C): 35.9 (01 Aug 2020 05:01), Max: 36.6 (2020 13:00)  T(F): 96.7 (01 Aug 2020 05:01), Max: 97.9 (2020 13:00)  HR: 89 (01 Aug 2020 05:01) (89 - 105)  BP: 133/83 (01 Aug 2020 05:01) (104/57 - 133/83)  BP(mean): --  RR: 18 (01 Aug 2020 08:59) (18 - 20)  SpO2: 97% (01 Aug 2020 08:59) (97% - 100%)    LABS:                        8.6    8.76  )-----------( 245      ( 2020 13:58 )             28.2     07-31    136  |  96<L>  |  16  ----------------------------<  107<H>  3.7   |  30  |  0.9    Ca    8.0<L>      2020 13:58    TPro  4.9<L>  /  Alb  2.8<L>  /  TBili  0.8  /  DBili  x   /  AST  12  /  ALT  6   /  AlkPhos  98  07-31    PT/INR - ( 2020 13:58 )   PT: 14.70 sec;   INR: 1.28 ratio       PTT - ( 2020 13:58 )  PTT:29.7 sec  Urinalysis Basic - ( 2020 11:24 )    Color: Yellow / Appearance: Clear / S.030 / pH: x  Gluc: x / Ketone: Small  / Bili: Small / Urobili: 3 mg/dL   Blood: x / Protein: 30 mg/dL / Nitrite: Negative   Leuk Esterase: Negative / RBC: 1 /HPF / WBC 2 /HPF   Sq Epi: x / Non Sq Epi: 1 /HPF / Bacteria: Negative    Creatine Kinase, Serum: 141 U/L (20 @ 15:40)  Troponin T, Serum: <0.01 ng/mL (20 @ 13:58)    CARDIAC MARKERS ( 2020 15:40 )  x     / x     / 141 U/L / x     / x      CARDIAC MARKERS ( 2020 13:58 )  x     / <0.01 ng/mL / x     / x     / x          RADIOLOGY:  < from: CT Head No Cont (20 @ 14:30) >  IMPRESSION:  No acute intracranial pathology. No evidence of midline shift, mass effect or intracranial hemorrhage.  < end of copied text >    PHYSICAL EXAM:  GENERAL: NAD, well-developed, AAOx2  HEENT:  Atraumatic, Normocephalic. conjunctiva and sclera clear, No JVD  PULMONARY: Clear to auscultation bilaterally; No wheeze  CARDIOVASCULAR: Regular rate and rhythm; No murmurs, rubs, or gallops  GASTROINTESTINAL: Soft, Nontender, Nondistended; Bowel sounds present  MUSCULOSKELETAL: chronic venous insufficiency, muscle strength 4/5 LE  NEUROLOGY: non-focal  SKIN: No rashes or lesions      ADMISSION SUMMARY  75 yo m hx metastatic lung ca comfort measures only, small B cell lymphoma, gerd, hypothyroid admitted for change in mental status and placement. pt is a poor historian and confused. Multiple attempts to reach the daughter over the phone for further history with no answer. History obtained partially from the patient and from the ED note. He mentioned that he fell today in the bathroom with no head injury. He does not remember why he fell and was found on the ground by the visiting nurse, the fall was unwitnessed. No LOC, nausea, vomiting but family stated that he has been off baseline post fall. He appeared confused and had a change in his speech pattern. Patient is being followed by hospice at home and family decided to pursue hospice in facility and is admitted for placement.    CTH done in ED showed no acute intracranial pathologies.  CXR showed large right pleural effusion with diffuse opacification throughout right lower and midlung fields. ED attending spoke with the family and decided for no further interventions.     ASSESSMENT & PLAN  75 yo m hx metastatic lung ca comfort measures only, small B cell lymphoma, gerd, hypothyroid admitted for change in mental status and placement.    #Unwitnessed Fall  - CTH showed no acute intracranial pathologies  - PT/rehab  - symptomatic treatment (morphine 2mg PRN)    # Oral Thrush   - Nystatin - swish and swallow     #Hypoalbuminemia 2/2 poor oral intake  - nutrition consult    # Metastatic lung Cancer  - Comfort measures only, followed by hospice at home  - Family interested in hospice in facility  - Rehab  - symptomatic treatment    # Small B cell lymphoma  - No treatment    # GERD  - pantoprazole 40    # Hypothyroidism  - synthroid 150    # DVT ppx: lovenox  # PPI ppx: pantoprazole  # DIET: DASH  # ACTIVITY: IAT, PT consult

## 2020-08-02 NOTE — PROGRESS NOTE ADULT - ASSESSMENT
75 yo m hx metastatic lung ca comfort measures only, small B cell lymphoma, gerd, hypothyroid admitted for change in mental status and placement.    #Unwitnessed Fall  - CTH showed no acute intracranial pathologies  - PT/rehab  - symptomatic treatment (morphine 2mg PRN)    # Oral Thrush   - Nystatin - swish and swallow     #Hypoalbuminemia 2/2 poor oral intake  - nutrition consult    # Metastatic lung Cancer  - Comfort measures only, followed by hospice at home  - Family interested in hospice in facility  - Rehab  - symptomatic treatment    # Small B cell lymphoma  - No treatment    # GERD  - pantoprazole 40    # Hypothyroidism  - synthroid 150    # DVT ppx: lovenox  # PPI ppx: pantoprazole  # DIET: DASH  # ACTIVITY: IAT, PT consult  Dispo: Pending [facility]. Anticipate

## 2020-08-02 NOTE — PROGRESS NOTE ADULT - SUBJECTIVE AND OBJECTIVE BOX
S: No new events/complaints      All other pertinent ROS negative.      08-01-20 @ 07:01  -  08-02-20 @ 07:00  --------------------------------------------------------  IN: 0 mL / OUT: 1 mL / NET: -1 mL    08-02-20 @ 07:01  -  08-02-20 @ 18:59  --------------------------------------------------------  IN: 0 mL / OUT: 1 mL / NET: -1 mL      Vital Signs Last 24 Hrs  T(C): 35.2 (02 Aug 2020 14:32), Max: 35.6 (01 Aug 2020 20:51)  T(F): 95.4 (02 Aug 2020 14:32), Max: 96.1 (02 Aug 2020 05:00)  HR: 126 (02 Aug 2020 14:32) (82 - 126)  BP: 138/67 (02 Aug 2020 14:32) (119/64 - 138/67)  BP(mean): --  RR: 18 (02 Aug 2020 14:32) (18 - 19)  SpO2: 95% (02 Aug 2020 07:40) (95% - 98%)  PHYSICAL EXAM:    Constitutional: NAD, awake and alert, well-developed  HEENT: PERR, EOMI, Normal Hearing, MMM  Neck: Soft and supple, No LAD, No JVD  Respiratory: dectreased BS at right  Cardiovascular: S1 and S2, regular rate and rhythm, no Murmurs, gallops or rubs  Gastrointestinal: Bowel Sounds present, soft, nontender, nondistended, no guarding, no rebound  Extremities: No peripheral edema      MEDICATIONS:  MEDICATIONS  (STANDING):  chlorhexidine 4% Liquid 1 Application(s) Topical <User Schedule>  enoxaparin Injectable 40 milliGRAM(s) SubCutaneous daily  levothyroxine 150 MICROGram(s) Oral daily  nystatin    Suspension 753974 Unit(s) Oral every 6 hours  pantoprazole    Tablet 40 milliGRAM(s) Oral before breakfast      LABS: All Labs Reviewed:                Blood Culture:     Radiology: reviewed

## 2020-08-03 NOTE — CHART NOTE - NSCHARTNOTEFT_GEN_A_CORE
RD Limited     C/s for nutrition assessment appreciated. per MD, pt is on hospice at home, comfort measures only. Add Ensure Enlive TID. RD to sign off.

## 2020-08-03 NOTE — PROGRESS NOTE ADULT - SUBJECTIVE AND OBJECTIVE BOX
GABE EDEN 74y Male  MRN#: [id number]   CODE STATUS:________      SUBJECTIVE  Patient is a 74y old Male who presents with a chief complaint of confusion (02 Aug 2020 18:59)  Currently admitted to medicine with the primary diagnosis of Weakness  Today is hospital day 3d, and this morning he is sleeping in bed with no complaints.   Patient was hypotensive overnight but blood pressure came up on its own.   Patient is comfort care only and awaiting placement     Present Today:           Bai Catheter (x)No/ ()Yes? Indication:          Central Line (x)No/ ()Yes? Indication:          IV Fluids (x)No/ ()Yes? Type:  Rate:  Indication:      OBJECTIVE  PAST MEDICAL & SURGICAL HISTORY  Cancer of lung  Hypothyroid  HTN (hypertension)  Lymphoma: Small B-cell lymphoma, no treatment  S/P lymph node biopsy  H/O thyroidectomy    ALLERGIES:  erythromycin (Rash)  Sulfa (Rash)    MEDICATIONS:  STANDING MEDICATIONS  chlorhexidine 4% Liquid 1 Application(s) Topical <User Schedule>  enoxaparin Injectable 40 milliGRAM(s) SubCutaneous daily  levothyroxine 150 MICROGram(s) Oral daily  nystatin    Suspension 446302 Unit(s) Oral every 6 hours  pantoprazole    Tablet 40 milliGRAM(s) Oral before breakfast    PRN MEDICATIONS  acetaminophen   Tablet .. 650 milliGRAM(s) Oral every 6 hours PRN      VITAL SIGNS: Last 24 Hours  T(C): 35.1 (03 Aug 2020 06:00), Max: 35.6 (02 Aug 2020 20:47)  T(F): 95.1 (03 Aug 2020 06:00), Max: 96 (02 Aug 2020 20:47)  HR: 117 (03 Aug 2020 06:00) (93 - 126)  BP: 127/55 (03 Aug 2020 06:00) (97/55 - 138/67)  BP(mean): --  RR: 18 (03 Aug 2020 06:00) (18 - 19)  SpO2: 97% (03 Aug 2020 06:00) (95% - 97%)    PHYSICAL EXAM:  GENERAL: NAD, well-developed, AAOx2, 3L NC  HEENT:  Atraumatic, Normocephalic. conjunctiva and sclera clear, No JVD  PULMONARY: Clear to auscultation bilaterally; No wheeze  CARDIOVASCULAR: Regular rate and rhythm; No murmurs, rubs, or gallops  GASTROINTESTINAL: Soft, Nontender, distended; Bowel sounds present  MUSCULOSKELETAL: chronic venous insufficiency, muscle strength 4/5 LE  NEUROLOGY: non-focal  SKIN: No rashes or lesions      ADMISSION SUMMARY  75 yo m hx metastatic lung ca comfort measures only, small B cell lymphoma, gerd, hypothyroid admitted for change in mental status and placement. pt is a poor historian and confused. Multiple attempts to reach the daughter over the phone for further history with no answer. History obtained partially from the patient and from the ED note. He mentioned that he fell today in the bathroom with no head injury. He does not remember why he fell and was found on the ground by the visiting nurse, the fall was unwitnessed. No LOC, nausea, vomiting but family stated that he has been off baseline post fall. He appeared confused and had a change in his speech pattern. Patient is being followed by hospice at home and family decided to pursue hospice in facility and is admitted for placement.    CTH done in ED showed no acute intracranial pathologies.  CXR showed large right pleural effusion with diffuse opacification throughout right lower and midlung fields. ED attending spoke with the family and decided for no further interventions.     ASSESSMENT & PLAN  75 yo m hx metastatic lung ca comfort measures only, small B cell lymphoma, gerd, hypothyroid admitted for change in mental status and placement.    #Unwitnessed Fall  - CTH showed no acute intracranial pathologies  - PT/rehab  - symptomatic treatment (morphine 2mg PRN)    # Oral Thrush   - Nystatin - swish and swallow     #Hypoalbuminemia 2/2 poor oral intake  - nutrition consult    # Metastatic lung Cancer  - Comfort measures only, followed by hospice at home  - Family interested in hospice in facility  - Rehab  - symptomatic treatment    # Small B cell lymphoma  - No treatment    # GERD  - pantoprazole 40    # Hypothyroidism  - synthroid 150    # DVT ppx: lovenox  # PPI ppx: pantoprazole  # DIET: DASH  # ACTIVITY: IAT, PT consult  #Dispo: pending [facility]

## 2020-08-04 NOTE — PROGRESS NOTE ADULT - SUBJECTIVE AND OBJECTIVE BOX
GABE EDEN 74y Male  MRN#: [id number]   CODE STATUS: DNR//DNI    SUBJECTIVE  Patient is a 74y old Male who presents with a chief complaint of confusion (03 Aug 2020 10:15)  Currently admitted to medicine with the primary diagnosis of Weakness  Today is hospital day 4d, and this morning he is lying in bed comfortably. Patient tends to get hypotensive at night.   Otherwise, no overnight events. Waiting for [facility] placement     Present Today:           Bai Catheter (x)No/ ()Yes? Indication:          Central Line (x)No/ ()Yes? Indication:          IV Fluids (x)No/ ()Yes? Type:  Rate:  Indication:      OBJECTIVE  PAST MEDICAL & SURGICAL HISTORY  Cancer of lung  Hypothyroid  HTN (hypertension)  Lymphoma: Small B-cell lymphoma, no treatment  S/P lymph node biopsy  H/O thyroidectomy    ALLERGIES:  erythromycin (Rash)  Sulfa (Rash)    MEDICATIONS:  STANDING MEDICATIONS  chlorhexidine 4% Liquid 1 Application(s) Topical <User Schedule>  enoxaparin Injectable 40 milliGRAM(s) SubCutaneous daily  levothyroxine 150 MICROGram(s) Oral daily  nystatin    Suspension 361396 Unit(s) Oral every 6 hours  pantoprazole    Tablet 40 milliGRAM(s) Oral before breakfast    PRN MEDICATIONS  acetaminophen   Tablet .. 650 milliGRAM(s) Oral every 6 hours PRN  morphine   Solution 10 milliGRAM(s) Oral every 4 hours PRN      VITAL SIGNS: Last 24 Hours  T(C): 36.2 (04 Aug 2020 04:48), Max: 36.2 (03 Aug 2020 21:00)  T(F): 97.1 (04 Aug 2020 04:48), Max: 97.1 (03 Aug 2020 21:00)  HR: 90 (04 Aug 2020 04:48) (85 - 97)  BP: 88/54 (04 Aug 2020 04:48) (88/54 - 126/64)  BP(mean): --  RR: 18 (04 Aug 2020 04:48) (18 - 18)  SpO2: 92% (03 Aug 2020 21:00) (92% - 95%)    LABS:   CMO    PHYSICAL EXAM:  GENERAL: NAD, well-developed, AAOx2, 3L NC  HEENT:  Atraumatic, Normocephalic. conjunctiva and sclera clear, No JVD  PULMONARY: Clear to auscultation bilaterally; No wheeze  CARDIOVASCULAR: Regular rate and rhythm; No murmurs, rubs, or gallops  GASTROINTESTINAL: Soft, Nontender, distended; Bowel sounds present  MUSCULOSKELETAL: chronic venous insufficiency, muscle strength 4/5 LE  NEUROLOGY: non-focal  SKIN: No rashes or lesions      ADMISSION SUMMARY  75 yo m hx metastatic lung ca comfort measures only, small B cell lymphoma, gerd, hypothyroid admitted for change in mental status and placement. pt is a poor historian and confused. Multiple attempts to reach the daughter over the phone for further history with no answer. History obtained partially from the patient and from the ED note. He mentioned that he fell today in the bathroom with no head injury. He does not remember why he fell and was found on the ground by the visiting nurse, the fall was unwitnessed. No LOC, nausea, vomiting but family stated that he has been off baseline post fall. He appeared confused and had a change in his speech pattern. Patient is being followed by hospice at home and family decided to pursue hospice in facility and is admitted for placement.    CTH done in ED showed no acute intracranial pathologies.  CXR showed large right pleural effusion with diffuse opacification throughout right lower and midlung fields. ED attending spoke with the family and decided for no further interventions.     ASSESSMENT & PLAN  75 yo m hx metastatic lung ca comfort measures only, small B cell lymphoma, gerd, hypothyroid admitted for change in mental status and placement.    #Unwitnessed Fall  - CTH showed no acute intracranial pathologies  - symptomatic treatment (morphine solution)    # Oral Thrush   - Nystatin - swish and swallow     #Hypoalbuminemia 2/2 poor oral intake  - nutrition consult    # Metastatic lung Cancer  - Comfort measures only, followed by hospice at home  - Family interested in hospice in facility  - Rehab  - symptomatic treatment    # Small B cell lymphoma  - No treatment    # GERD  - pantoprazole 40    # Hypothyroidism  - synthroid 150    # DVT ppx: lovenox  # PPI ppx: pantoprazole  # DIET: Regular  # ACTIVITY: IAT  #Dispo: pending [facility]

## 2020-08-04 NOTE — PROGRESS NOTE ADULT - ATTENDING COMMENTS
73 yo m hx metastatic lung ca comfort measures only, small B cell lymphoma, gerd, hypothyroid admitted for change in mental status and placement.    #Unwitnessed Fall  - CTH showed no acute intracranial pathologies  - PT/rehab  - symptomatic treatment (morphine 2mg PRN)    # Oral Thrush   - Nystatin - swish and swallow     #Hypoalbuminemia 2/2 poor oral intake  - nutrition consult    # Metastatic lung Cancer  - Comfort measures only, followed by hospice at home  - Family interested in hospice in facility  - Rehab  - symptomatic treatment  Possible [facility] on Wednesday. F/u CM.     # Small B cell lymphoma  - No treatment    # GERD  - pantoprazole 40    # Hypothyroidism  - synthroid 150    # DVT ppx: lovenox  # PPI ppx: pantoprazole  # DIET: DASH  # ACTIVITY: IAT, PT consult  Dispo: Pending [facility] for wednesday .
73 yo m hx metastatic lung ca comfort measures only, small B cell lymphoma, gerd, hypothyroid admitted for change in mental status and placement.    #Unwitnessed Fall  - CTH showed no acute intracranial pathologies  - PT/rehab  - symptomatic treatment (morphine 2mg PRN)    # Oral Thrush   - Nystatin - swish and swallow     #Hypoalbuminemia 2/2 poor oral intake  - nutrition consult    # Metastatic lung Cancer  - Comfort measures only, followed by hospice at home  - Family interested in hospice in facility  - Rehab  - symptomatic treatment    # Small B cell lymphoma  - No treatment    # GERD  - pantoprazole 40    # Hypothyroidism  - synthroid 150    # DVT ppx: lovenox  # PPI ppx: pantoprazole  # DIET: DASH  # ACTIVITY: IAT, PT consult  Dispo: Pending [facility] for wednesday

## 2020-08-04 NOTE — DISCHARGE NOTE PROVIDER - CARE PROVIDER_API CALL
Bertram Lopez  ENDOCRINOLOGY/METAB/DIABETES  [address] [place] [address]  Phone: [phone]  Fax: [phone]  Follow Up Time:

## 2020-08-04 NOTE — DISCHARGE NOTE PROVIDER - NSDCMRMEDTOKEN_GEN_ALL_CORE_FT
lisinopril-hydrochlorothiazide 20 mg-25 mg oral tablet: 1 tab(s) orally once a day  metoprolol succinate 100 mg oral tablet, extended release: 1 tab(s) orally once a day  omeprazole 40 mg oral delayed release capsule: 1 cap(s) orally once a day  Percocet 5/325 oral tablet: 1 tab(s) orally every 6 hours, As Needed MDD:4   Synthroid 150 mcg (0.15 mg) oral tablet: 1 tab(s) orally once a day acetaminophen 325 mg oral tablet: 2 tab(s) orally every 6 hours, As needed, Moderate Pain (4 - 6)  Keflex 500 mg oral capsule: 1 cap(s) orally 2 times a day   metoprolol succinate 100 mg oral tablet, extended release: 1 tab(s) orally once a day  morphine 10 mg/5 mL oral solution: 5 milliliter(s) orally every 4 hours, As needed, Moderate Pain (4 - 6)  nystatin 100,000 units/mL oral suspension: 5 milliliter(s) orally every 6 hours  omeprazole 40 mg oral delayed release capsule: 1 cap(s) orally once a day  Percocet 5/325 oral tablet: 1 tab(s) orally every 6 hours, As Needed MDD:4   Synthroid 150 mcg (0.15 mg) oral tablet: 1 tab(s) orally once a day acetaminophen 325 mg oral tablet: 2 tab(s) orally every 6 hours, As needed, Moderate Pain (4 - 6)  metoprolol succinate 100 mg oral tablet, extended release: 1 tab(s) orally once a day  morphine 10 mg/5 mL oral solution: 5 milliliter(s) orally every 4 hours, As needed, Moderate Pain (4 - 6)  nystatin 100,000 units/mL oral suspension: 5 milliliter(s) orally every 6 hours  Synthroid 150 mcg (0.15 mg) oral tablet: 1 tab(s) orally once a day

## 2020-08-04 NOTE — DISCHARGE NOTE PROVIDER - NSDCCPCAREPLAN_GEN_ALL_CORE_FT
PRINCIPAL DISCHARGE DIAGNOSIS  Diagnosis: Failure to thrive in adult  Assessment and Plan of Treatment: You came to the hospital due to a change in your mental status and for a unmitnessed fall. You are getting morphine for pain. Admission to the hospital was for placement into [facility]. PRINCIPAL DISCHARGE DIAGNOSIS  Diagnosis: Failure to thrive in adult  Assessment and Plan of Treatment: You came to the hospital due to a change in your mental status and for a unmitnessed fall. You are getting morphine for pain. Your treatment plan is comfort measures only. Admission to the hospital was for placement into [facility].      SECONDARY DISCHARGE DIAGNOSES  Diagnosis: Possible urinary tract infection  Assessment and Plan of Treatment: You were having symptoms of a urinary tract infection; pain with urination and increased frequency. Please take the antibiotics upon discharge.

## 2020-08-04 NOTE — PHYSICAL THERAPY INITIAL EVALUATION ADULT - SPECIFY REASON(S)
Pt is in hospice care, comfort measure only. Will be going for hospice care or to [facility] for hospice as per case management. D/C fom PT.

## 2020-08-04 NOTE — DISCHARGE NOTE PROVIDER - HOSPITAL COURSE
75 yo m hx metastatic lung ca comfort measures only, small B cell lymphoma, gerd, hypothyroid admitted for change in mental status and placement. pt is a poor historian and confused. He mentioned that he fell today in the bathroom with no head injury. He does not remember why he fell and was found on the ground by the visiting nurse, the fall was unwitnessed. No LOC, nausea, vomiting but family stated that he has been off baseline post fall. He appeared confused and had a change in his speech pattern. Patient is being followed by hospice at home and family decided to pursue hospice in facility and is admitted for [facility] placement.    CTH done in ED showed no acute intracranial pathologies. CXR showed large right pleural effusion with diffuse opacification throughout right lower and midlung fields. ED attending spoke with the family and decided for no further interventions. 75 yo m hx metastatic lung ca comfort measures only, small B cell lymphoma, gerd, hypothyroid admitted for change in mental status and placement. pt is a poor historian and confused. He mentioned that he fell today in the bathroom with no head injury. He does not remember why he fell and was found on the ground by the visiting nurse, the fall was unwitnessed. No LOC, nausea, vomiting but family stated that he has been off baseline post fall. He appeared confused and had a change in his speech pattern. Patient is being followed by hospice at home and family decided to pursue hospice in facility and is admitted for [facility] placement.    CTH done in ED showed no acute intracranial pathologies. CXR showed large right pleural effusion with diffuse opacification throughout right lower and midlung fields. ED attending spoke with the family and decided for no further interventions. Patient is going to [facility] today.

## 2020-08-05 NOTE — DISCHARGE NOTE NURSING/CASE MANAGEMENT/SOCIAL WORK - PATIENT PORTAL LINK FT
You can access the FollowMyHealth Patient Portal offered by [facility] by registering at the following website: [email or web address]. By joining [facility]’s FollowMyHealth portal, you will also be able to view your health information using other applications (apps) compatible with our system.

## 2020-08-05 NOTE — CHART NOTE - NSCHARTNOTEFT_GEN_A_CORE
<<<RESIDENT DISCHARGE NOTE>>>     GABE EDEN  MRN-[id number]    Patient seen and examined at bedside. He was complaining of urinary frequency and dysuria. Will start patient on abx to take upon discharge.  Patient is also hypertensive and given IV labetolol push.     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 Ninfa Thacker    DISCHARGE MEDICATION RECONCILIATION      DISPOSITION:   [  ] Home,    [  ] Home with Visiting Nursing Services,   [  ]  SNF/ NH,    [  ] Acute Rehab (4A),   [ x ] Other (Specify: [facility]) <<<RESIDENT DISCHARGE NOTE>>>     GABE EDEN  MRN-[id number]    Patient seen and examined at bedside. He was complaining of urinary frequency and dysuria.   Patient is also hypertensive and given IV labetolol push. Patient states he feels better now.     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 Ninfa Thacker    DISCHARGE MEDICATION RECONCILIATION      DISPOSITION:   [  ] Home,    [  ] Home with Visiting Nursing Services,   [  ]  SNF/ NH,    [  ] Acute Rehab (4A),   [ x ] Other (Specify: [facility])

## 2020-08-10 DIAGNOSIS — K21.9 GASTRO-ESOPHAGEAL REFLUX DISEASE WITHOUT ESOPHAGITIS: ICD-10-CM

## 2020-08-10 DIAGNOSIS — E89.0 POSTPROCEDURAL HYPOTHYROIDISM: ICD-10-CM

## 2020-08-10 DIAGNOSIS — B37.0 CANDIDAL STOMATITIS: ICD-10-CM

## 2020-08-10 DIAGNOSIS — D66 HEREDITARY FACTOR VIII DEFICIENCY: ICD-10-CM

## 2020-08-10 DIAGNOSIS — Z87.891 PERSONAL HISTORY OF NICOTINE DEPENDENCE: ICD-10-CM

## 2020-08-10 DIAGNOSIS — R29.6 REPEATED FALLS: ICD-10-CM

## 2020-08-10 DIAGNOSIS — R41.82 ALTERED MENTAL STATUS, UNSPECIFIED: ICD-10-CM

## 2020-08-10 DIAGNOSIS — C85.10 UNSPECIFIED B-CELL LYMPHOMA, UNSPECIFIED SITE: ICD-10-CM

## 2020-08-10 DIAGNOSIS — Z88.2 ALLERGY STATUS TO SULFONAMIDES: ICD-10-CM

## 2020-08-10 DIAGNOSIS — Z80.8 FAMILY HISTORY OF MALIGNANT NEOPLASM OF OTHER ORGANS OR SYSTEMS: ICD-10-CM

## 2020-08-10 DIAGNOSIS — I10 ESSENTIAL (PRIMARY) HYPERTENSION: ICD-10-CM

## 2020-08-10 DIAGNOSIS — C34.90 MALIGNANT NEOPLASM OF UNSPECIFIED PART OF UNSPECIFIED BRONCHUS OR LUNG: ICD-10-CM

## 2020-08-10 DIAGNOSIS — D64.9 ANEMIA, UNSPECIFIED: ICD-10-CM

## 2020-08-10 DIAGNOSIS — R64 CACHEXIA: ICD-10-CM

## 2020-08-10 DIAGNOSIS — Z80.3 FAMILY HISTORY OF MALIGNANT NEOPLASM OF BREAST: ICD-10-CM

## 2020-08-10 DIAGNOSIS — E88.09 OTHER DISORDERS OF PLASMA-PROTEIN METABOLISM, NOT ELSEWHERE CLASSIFIED: ICD-10-CM

## 2020-08-12 DIAGNOSIS — C34.91 MALIGNANT NEOPLASM OF UNSPECIFIED PART OF RIGHT BRONCHUS OR LUNG: ICD-10-CM

## 2022-04-12 NOTE — ASU PATIENT PROFILE, ADULT - ARRIVAL TIME
Called pt and left a detailed VM. Per MD instructions below.       ----- Message from Rd Angeles MD sent at 4/12/2022  8:15 AM CDT -----  Kidney function slightly abnormalDecrease methotrexate to 4 tablets once a week       09:30

## 2022-06-03 NOTE — ASU PREOP CHECKLIST - HEART RATE (BEATS/MIN)
DIONISIO with patient informing 6/21/22 appointment cancelled due to Dr. Mary Alice Mc no longer being with [facility].  Requested return call to discuss rescheduling with Dr. Heraclio Arthur, call back number provided. 
Spoke with patients wife, informed 6/21/22 appointment cancelled. Agreeable to see Dr. Nika Shabazz.
88

## 2023-07-20 NOTE — ED PROVIDER NOTE - CROS ED HEME ALL NEG
At checkout tried scheduling Cb with  but  was unable to schedule with the diagnoses code on the referral, stating he does not see patients for the code listed. please advise   
negative...

## 2024-01-13 NOTE — DISCHARGE NOTE NURSING/CASE MANAGEMENT/SOCIAL WORK - NSTRANSFERBELONGINGSRESP_GEN_A_NUR
yes/pt informed
PHYSICAL EXAM:  GENERAL: NAD, Resting in bed  Eyes: EOMI, PERRLA, conjunctiva and sclera clear  HENT:  Head atraumatic; Moist mucous membranes, normal oropharynx  NECK: Supple, No JVD, no lymphadenopathy, no thyroid nodules or enlargement  CHEST/LUNG: Clear to auscultation bilaterally; No rales, rhonchi, wheezing, or rubs. Unlabored respirations on room air  HEART: Regular rate and rhythm; No murmurs, rubs, or gallops  ABDOMEN: Bowel sounds present; Soft, Nontender, Nondistended. laparoscopic incisions clean  EXTREMITIES:  2+ Peripheral Pulses, brisk capillary refill. No clubbing, cyanosis, or edema. No lower extremity edema   NERVOUS SYSTEM:  Alert & Oriented X3, non-focal and spontaneous movements of all extremities  SKIN: No rashes or lesions  Psych: Normal behavior, normal affect, normal speech

## 2024-11-06 NOTE — PRE-ANESTHESIA EVALUATION ADULT - NSANTHOBSERVEDRD_ENT_A_CORE
Visit summary:  - Overall kidney function is stable and at baseline  - Blood pressure is low normal but I will have you send in a week of readings using the left arm just sitting before blood pressure or any other medications both morning and evening along with heart rate and send those into the office  - I will also have you go for a urine to make sure you do not have a urinary tract infection that will be in the computer    - Finally, minor elevation in liver tests on going to have you go for lab work today as well    We are also going to set you up for an advance care plan meeting which is like a living well for the computer/chart we will set this up at your next meeting        1. Medication changes today:  No medication changes today    2.  General instructions:  Please avoid salt  Remain as active as you are able to    3.  Please go for non fasting  lab work at this time including the urine as well as a blood test in the next day or so    4.  Please take 1 week a blood pressure readings as outlined above at this time just sitting using left arm    AS FOLLOWS  MORNING AND EVENING, SITTING AND STANDING as follows:  TAKE THE MORNING READINGS BEFORE ANY MEDICATIONS AND WHEN YOU ARE RELAXED FOR SEVERAL MINUTES  TAKE THE EVENING READINGS:  BETWEEN 7-10 P.M.; PRIOR TO ANY MEDICATIONS; AT LEAST IN OUR  FROM DINNER; AND CERTAINLY AFTER RELAXING FOR A FEW MINUTES  PLEASE INCLUDE HEART RATE WITH YOUR BLOOD PRESSURE READINGS  When taking standing readings, keep your arm supported at heart level and not dangling  Make sure you are sitting with your back supported and feet on the ground and do not cross your legs or feet  Make sure you have not taken any coffee or caffeine products or exercised or smoke cigarettes at least 30 minutes before taking your blood pressure  Then please mail these readings into the office            5.  Follow-up in 4-5 months  Please bring in 1 week a blood pressure readings morning  evening, sitting and standing is outlined above  PLEASE BRING AN YOUR BLOOD PRESSURE MACHINE TO CORRELATE WITH THE OFFICE MACHINE AT THIS NEXT SCHEDULED VISIT  Please go for fasting lab work 1-2 weeks prior to your appointment      6.  General non medical recommendations:  AVOID SALT BUT NOT ADDING AN READING LABELS TO MAKE SURE THERE IS LOW-SALT IN THE FOOD THAT YOU ARE EATING  Goal is less than 2 g of sodium intake or less than 5 g of sodium chloride intake per day    Avoid nonsteroidal anti-inflammatory drugs such as Naprosyn, ibuprofen, Aleve, Advil, Celebrex, Meloxicam (Mobic) etc.  You can use Tylenol as needed if you do not have any liver condition to be concerned about    Avoid medications such as Sudafed or decongestants and antihistamines that contained the D component which is the decongestant.  You can take antihistamines without the decongestant or D component.    Try to avoid medications such as pantoprazole or  Protonix/Nexium or Esomeprazole)/Prilosec or omeprazole/Prevacid or lansoprazole/AcipHex or Rabeprazole.  If you are able to, use Pepcid as this is safer for your kidneys.    Try to exercise at least 30 minutes 3 days a week to begin with with an ultimate goal of 5 days a week for at least 30 minutes    Please do not drink more than 2 glasses of alcohol/wine on a daily basis as this may contribute to your high blood pressure.         No

## 2025-01-28 NOTE — H&P PST ADULT - NSANTHSNORERD_ENT_A_CORE
01/28/25  Screened by: Ginny Gonzalez    Referring Provider Repeat colonoscopy in 3 years      Pre- Screening:     BMI  25.9   Has patient been referred for a routine screening Colonoscopy? yes  Is the patient between 45-75 years old? yes      Previous Colonoscopy yes   If yes:    Date: 2021    Facility: [facility]     Reason:       Does the patient want to see a Gastroenterologist prior to their procedure OR are they having any GI symptoms? no    Has the patient been hospitalized or had abdominal surgery in the past 6 months? no    Does the patient use supplemental oxygen? no    Does the patient take Coumadin, Lovenox, Plavix, Elliquis, Xarelto, or other blood thinning medication? no    Has the patient had a stroke, cardiac event, or stent placed in the past year? no      If patient is between 45yrs - 49yrs, please advise patient that we will have to confirm benefits & coverage with their insurance company for a routine screening colonoscopy.     No
